# Patient Record
Sex: FEMALE | Race: WHITE | Employment: OTHER | URBAN - METROPOLITAN AREA
[De-identification: names, ages, dates, MRNs, and addresses within clinical notes are randomized per-mention and may not be internally consistent; named-entity substitution may affect disease eponyms.]

---

## 2019-07-01 ENCOUNTER — MEDICAL CORRESPONDENCE (OUTPATIENT)
Dept: HEALTH INFORMATION MANAGEMENT | Facility: CLINIC | Age: 84
End: 2019-07-01

## 2019-07-18 ENCOUNTER — APPOINTMENT (OUTPATIENT)
Dept: GENERAL RADIOLOGY | Facility: HOSPITAL | Age: 84
DRG: 689 | End: 2019-07-18
Attending: EMERGENCY MEDICINE
Payer: MEDICARE

## 2019-07-18 ENCOUNTER — HOSPITAL ENCOUNTER (INPATIENT)
Facility: HOSPITAL | Age: 84
LOS: 5 days | Discharge: NURSING, REHAB OR LONG-TERM CARE | DRG: 689 | End: 2019-07-24
Attending: EMERGENCY MEDICINE | Admitting: INTERNAL MEDICINE
Payer: MEDICARE

## 2019-07-18 ENCOUNTER — APPOINTMENT (OUTPATIENT)
Dept: CT IMAGING | Facility: HOSPITAL | Age: 84
DRG: 689 | End: 2019-07-18
Attending: EMERGENCY MEDICINE
Payer: MEDICARE

## 2019-07-18 DIAGNOSIS — R55 SYNCOPE: ICD-10-CM

## 2019-07-18 DIAGNOSIS — I95.1 ORTHOSTATIC HYPOTENSION: ICD-10-CM

## 2019-07-18 DIAGNOSIS — S01.01XA SCALP LACERATION, INITIAL ENCOUNTER: ICD-10-CM

## 2019-07-18 DIAGNOSIS — E11.9 TYPE 2 DIABETES MELLITUS WITHOUT COMPLICATION, WITHOUT LONG-TERM CURRENT USE OF INSULIN (H): ICD-10-CM

## 2019-07-18 DIAGNOSIS — I10 ESSENTIAL HYPERTENSION: ICD-10-CM

## 2019-07-18 DIAGNOSIS — G89.29 CHRONIC LEFT HIP PAIN: ICD-10-CM

## 2019-07-18 DIAGNOSIS — R55 SYNCOPE, UNSPECIFIED SYNCOPE TYPE: ICD-10-CM

## 2019-07-18 DIAGNOSIS — R21 RASH: ICD-10-CM

## 2019-07-18 DIAGNOSIS — M25.552 CHRONIC LEFT HIP PAIN: ICD-10-CM
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
## 2019-07-18 DIAGNOSIS — K21.9 GASTROESOPHAGEAL REFLUX DISEASE WITHOUT ESOPHAGITIS: ICD-10-CM

## 2019-07-18 DIAGNOSIS — N30.00 ACUTE CYSTITIS WITHOUT HEMATURIA: ICD-10-CM

## 2019-07-18 DIAGNOSIS — S31.000A WOUND OF SACRAL REGION, INITIAL ENCOUNTER: ICD-10-CM

## 2019-07-18 DIAGNOSIS — S72.102A CLOSED FRACTURE OF TROCHANTER OF LEFT FEMUR, INITIAL ENCOUNTER (H): ICD-10-CM

## 2019-07-18 DIAGNOSIS — R11.0 NAUSEA: ICD-10-CM

## 2019-07-18 DIAGNOSIS — K59.00 CONSTIPATION, UNSPECIFIED CONSTIPATION TYPE: ICD-10-CM

## 2019-07-18 LAB
ALBUMIN SERPL-MCNC: 3.4 G/DL (ref 3.4–5)
ALBUMIN UR-MCNC: 10 MG/DL
ALP SERPL-CCNC: 144 U/L (ref 40–150)
ALT SERPL W P-5'-P-CCNC: 20 U/L (ref 0–50)
ANION GAP SERPL CALCULATED.3IONS-SCNC: 8 MMOL/L (ref 3–14)
APPEARANCE UR: ABNORMAL
AST SERPL W P-5'-P-CCNC: 16 U/L (ref 0–45)
BACTERIA #/AREA URNS HPF: ABNORMAL /HPF
BASOPHILS # BLD AUTO: 0.1 10E9/L (ref 0–0.2)
BASOPHILS NFR BLD AUTO: 0.7 %
BILIRUB SERPL-MCNC: 0.1 MG/DL (ref 0.2–1.3)
BILIRUB UR QL STRIP: NEGATIVE
BUN SERPL-MCNC: 20 MG/DL (ref 7–30)
CALCIUM SERPL-MCNC: 9.4 MG/DL (ref 8.5–10.1)
CHLORIDE SERPL-SCNC: 113 MMOL/L (ref 94–109)
CO2 SERPL-SCNC: 19 MMOL/L (ref 20–32)
COLOR UR AUTO: YELLOW
CREAT SERPL-MCNC: 0.71 MG/DL (ref 0.52–1.04)
DIFFERENTIAL METHOD BLD: ABNORMAL
EOSINOPHIL # BLD AUTO: 0.3 10E9/L (ref 0–0.7)
EOSINOPHIL NFR BLD AUTO: 3.8 %
ERYTHROCYTE [DISTWIDTH] IN BLOOD BY AUTOMATED COUNT: 13.9 % (ref 10–15)
GFR SERPL CREATININE-BSD FRML MDRD: 75 ML/MIN/{1.73_M2}
GLUCOSE SERPL-MCNC: 169 MG/DL (ref 70–99)
GLUCOSE UR STRIP-MCNC: NEGATIVE MG/DL
HCT VFR BLD AUTO: 35.8 % (ref 35–47)
HGB BLD-MCNC: 11.6 G/DL (ref 11.7–15.7)
HGB UR QL STRIP: NEGATIVE
IMM GRANULOCYTES # BLD: 0.1 10E9/L (ref 0–0.4)
IMM GRANULOCYTES NFR BLD: 1 %
INR PPP: 1.02 (ref 0.8–1.2)
KETONES UR STRIP-MCNC: NEGATIVE MG/DL
LEUKOCYTE ESTERASE UR QL STRIP: ABNORMAL
LYMPHOCYTES # BLD AUTO: 2.5 10E9/L (ref 0.8–5.3)
LYMPHOCYTES NFR BLD AUTO: 29.1 %
MCH RBC QN AUTO: 30.1 PG (ref 26.5–33)
MCHC RBC AUTO-ENTMCNC: 32.4 G/DL (ref 31.5–36.5)
MCV RBC AUTO: 93 FL (ref 78–100)
MONOCYTES # BLD AUTO: 0.6 10E9/L (ref 0–1.3)
MONOCYTES NFR BLD AUTO: 6.5 %
MUCOUS THREADS #/AREA URNS LPF: PRESENT /LPF
NEUTROPHILS # BLD AUTO: 5.1 10E9/L (ref 1.6–8.3)
NEUTROPHILS NFR BLD AUTO: 58.9 %
NITRATE UR QL: NEGATIVE
NRBC # BLD AUTO: 0 10*3/UL
NRBC BLD AUTO-RTO: 0 /100
PH UR STRIP: 6 PH (ref 4.7–8)
PLATELET # BLD AUTO: 398 10E9/L (ref 150–450)
POTASSIUM SERPL-SCNC: 4.8 MMOL/L (ref 3.4–5.3)
PROT SERPL-MCNC: 7.1 G/DL (ref 6.8–8.8)
RBC # BLD AUTO: 3.85 10E12/L (ref 3.8–5.2)
RBC #/AREA URNS AUTO: <1 /HPF (ref 0–2)
SODIUM SERPL-SCNC: 140 MMOL/L (ref 133–144)
SOURCE: ABNORMAL
SP GR UR STRIP: 1.02 (ref 1–1.03)
TROPONIN I SERPL-MCNC: <0.015 UG/L (ref 0–0.04)
UROBILINOGEN UR STRIP-MCNC: NORMAL MG/DL (ref 0–2)
WBC # BLD AUTO: 8.6 10E9/L (ref 4–11)
WBC #/AREA URNS AUTO: 7 /HPF (ref 0–5)

## 2019-07-18 PROCEDURE — 96360 HYDRATION IV INFUSION INIT: CPT

## 2019-07-18 PROCEDURE — 85025 COMPLETE CBC W/AUTO DIFF WBC: CPT | Performed by: EMERGENCY MEDICINE

## 2019-07-18 PROCEDURE — 80053 COMPREHEN METABOLIC PANEL: CPT | Performed by: EMERGENCY MEDICINE

## 2019-07-18 PROCEDURE — 93010 ELECTROCARDIOGRAM REPORT: CPT | Performed by: INTERNAL MEDICINE

## 2019-07-18 PROCEDURE — 70450 CT HEAD/BRAIN W/O DYE: CPT | Mod: TC

## 2019-07-18 PROCEDURE — 93005 ELECTROCARDIOGRAM TRACING: CPT

## 2019-07-18 PROCEDURE — 40000788 ZZHCL STATISTIC ESTIMATED AVERAGE GLUCOSE: Performed by: HOSPITALIST

## 2019-07-18 PROCEDURE — 84484 ASSAY OF TROPONIN QUANT: CPT | Performed by: EMERGENCY MEDICINE

## 2019-07-18 PROCEDURE — 25800030 ZZH RX IP 258 OP 636: Performed by: INTERNAL MEDICINE

## 2019-07-18 PROCEDURE — 83036 HEMOGLOBIN GLYCOSYLATED A1C: CPT | Performed by: HOSPITALIST

## 2019-07-18 PROCEDURE — 99283 EMERGENCY DEPT VISIT LOW MDM: CPT | Mod: Z6 | Performed by: EMERGENCY MEDICINE

## 2019-07-18 PROCEDURE — 72125 CT NECK SPINE W/O DYE: CPT | Mod: TC

## 2019-07-18 PROCEDURE — 87086 URINE CULTURE/COLONY COUNT: CPT | Performed by: NURSE PRACTITIONER

## 2019-07-18 PROCEDURE — 85610 PROTHROMBIN TIME: CPT | Performed by: EMERGENCY MEDICINE

## 2019-07-18 PROCEDURE — 81001 URINALYSIS AUTO W/SCOPE: CPT | Performed by: EMERGENCY MEDICINE

## 2019-07-18 PROCEDURE — 99222 1ST HOSP IP/OBS MODERATE 55: CPT | Mod: AI | Performed by: HOSPITALIST

## 2019-07-18 PROCEDURE — 71046 X-RAY EXAM CHEST 2 VIEWS: CPT | Mod: TC

## 2019-07-18 PROCEDURE — 36415 COLL VENOUS BLD VENIPUNCTURE: CPT | Performed by: EMERGENCY MEDICINE

## 2019-07-18 PROCEDURE — 25000128 H RX IP 250 OP 636: Performed by: EMERGENCY MEDICINE

## 2019-07-18 PROCEDURE — 99285 EMERGENCY DEPT VISIT HI MDM: CPT | Mod: 25

## 2019-07-18 PROCEDURE — 25000132 ZZH RX MED GY IP 250 OP 250 PS 637: Mod: GY

## 2019-07-18 RX ORDER — INSULIN GLARGINE 100 [IU]/ML
10 INJECTION, SOLUTION SUBCUTANEOUS AT BEDTIME
Status: ON HOLD | COMMUNITY
End: 2019-07-23

## 2019-07-18 RX ORDER — SODIUM CHLORIDE 9 MG/ML
INJECTION, SOLUTION INTRAVENOUS CONTINUOUS
Status: DISCONTINUED | OUTPATIENT
Start: 2019-07-18 | End: 2019-07-19

## 2019-07-18 RX ORDER — ASPIRIN 81 MG
100 TABLET, DELAYED RELEASE (ENTERIC COATED) ORAL DAILY
Status: ON HOLD | COMMUNITY
End: 2019-07-19

## 2019-07-18 RX ORDER — ACETAMINOPHEN 325 MG/1
650 TABLET ORAL ONCE
Status: COMPLETED | OUTPATIENT
Start: 2019-07-18 | End: 2019-07-18

## 2019-07-18 RX ORDER — ACETAMINOPHEN 325 MG/1
TABLET ORAL
Status: COMPLETED
Start: 2019-07-18 | End: 2019-07-18

## 2019-07-18 RX ORDER — GABAPENTIN 100 MG/1
200 CAPSULE ORAL 2 TIMES DAILY
COMMUNITY

## 2019-07-18 RX ORDER — L. ACIDOPHILUS/PECTIN, CITRUS 25MM-100MG
1 TABLET ORAL DAILY
COMMUNITY
End: 2019-11-05

## 2019-07-18 RX ORDER — ASPIRIN 81 MG/1
81 TABLET ORAL DAILY
COMMUNITY

## 2019-07-18 RX ADMIN — SODIUM CHLORIDE: 9 INJECTION, SOLUTION INTRAVENOUS at 23:32

## 2019-07-18 RX ADMIN — ACETAMINOPHEN 650 MG: 325 TABLET ORAL at 23:33

## 2019-07-18 RX ADMIN — SODIUM CHLORIDE 1000 ML: 9 INJECTION, SOLUTION INTRAVENOUS at 20:08

## 2019-07-18 RX ADMIN — ACETAMINOPHEN 650 MG: 325 TABLET, FILM COATED ORAL at 23:33

## 2019-07-18 ASSESSMENT — ENCOUNTER SYMPTOMS
SHORTNESS OF BREATH: 0
FEVER: 0
ABDOMINAL PAIN: 0

## 2019-07-18 NOTE — ED PROVIDER NOTES
History     Chief Complaint   Patient presents with     Fall     in bathroom. hit head. positive loss of consciousness. no blood thinners      HPI  Nicole Johnson is a 89 year old female who presents to the emergency department via EMS.  Patient apparently fell in the bathroom and hit the back of the head with bleeding.  There was loss of consciousness.  Patient does not know whether she slipped and fell or she passed out and fell.  All the history was obtained from the nursing staff.  On asking patient questions, she states that she does not remember and she does not know.  Denies any headache or neck pains.  No nausea, vomiting, back pain, weakness on the legs or one side of the body.    Allergies:  Allergies   Allergen Reactions     Carvedilol      Fentanyl      Gabapentin      Glucophage [Metformin]      Januvia [Sitagliptin]      Latex      Plavix [Clopidogrel]        Problem List:    Patient Active Problem List    Diagnosis Date Noted     Syncope 07/19/2019     Priority: Medium     Falls frequently 07/19/2019     Priority: Medium     Urinary retention 07/19/2019     Priority: Medium     Chronic left hip pain 07/04/2019     Priority: Medium     Anxiety with depression 07/30/2018     Priority: Medium     Bilateral sciatica 07/30/2018     Priority: Medium     Has tried multiple meds did not tolerate.  Has had injections from pain management for this.       Essential hypertension 07/30/2018     Priority: Medium     Gastroesophageal reflux disease without esophagitis 07/30/2018     Priority: Medium     Seasonal allergic rhinitis 07/30/2018     Priority: Medium     Vitamin D deficiency 07/30/2018     Priority: Medium     Type 2 diabetes mellitus without complication, without long-term current use of insulin (H) 07/30/1996     Priority: Medium        Past Medical History:    Past Medical History:   Diagnosis Date     Anxiety with depression 7/30/2018     Bilateral artificial lens implant 10/18/2018     Bilateral  pubic rami fractures (H) 2019     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     Chronic left hip pain 7/4/2019     CVA (cerebral vascular accident) (H) 2011     Essential hypertension 7/30/2018     Gastroesophageal reflux disease without esophagitis 7/30/2018     Macular retinal edema 10/18/2018     Multiple falls      Other constipation 7/30/2018     Seasonal allergic rhinitis 7/30/2018     Syncope 7/19/2019     Type 2 diabetes mellitus without complication, without long-term current use of insulin (H) 7/30/1996     Vitamin D deficiency 7/30/2018       Past Surgical History:    Past Surgical History:   Procedure Laterality Date     ARTHROPLASTY ELBOW  2011     CHOLECYSTECTOMY  1984       Family History:    No family history on file.    Social History:  Marital Status:    Social History     Tobacco Use     Smoking status: None   Substance Use Topics     Alcohol use: None     Drug use: None        Medications:      No current outpatient medications on file.      Review of Systems   Constitutional: Negative for fever.   Respiratory: Negative for shortness of breath.    Cardiovascular: Negative for chest pain.   Gastrointestinal: Negative for abdominal pain.   All other systems reviewed and are negative.      Physical Exam   BP: 150/76  Heart Rate: 62  Temp: 97.7  F (36.5  C)  Resp: 16  Weight: 61 kg (134 lb 7.7 oz)  SpO2: 94 %  Lying Orthostatic BP: 196/66  Lying Orthostatic Pulse: 71 bpm  Sitting Orthostatic BP: 150/61  Sitting Orthostatic Pulse: 70 bpm  Standing Orthostatic BP: 157/103  Standing Orthostatic Pulse: 74 bpm      Physical Exam   Constitutional: She appears well-developed and well-nourished. No distress.   HENT:   Head: Normocephalic.       Mouth/Throat: Oropharynx is clear and moist. No oropharyngeal exudate.   Eyes: Pupils are equal, round, and reactive to light. No scleral icterus.   Neck:   C-collar in place   Cardiovascular: Normal rate, regular rhythm, normal heart sounds and intact distal pulses.    Pulmonary/Chest: Effort normal and breath sounds normal. No stridor. No respiratory distress. She has no wheezes.   Abdominal: Soft. Bowel sounds are normal. She exhibits no distension. There is no tenderness.   Musculoskeletal: She exhibits no edema or tenderness.   Skin: Skin is warm. No rash noted. She is not diaphoretic.   Nursing note and vitals reviewed.      ED Course        Procedures       EKG Interpretation:      Interpreted by Margarito Mae  Time reviewed: 6:05 PM  Symptoms at time of EKG: Heartburn  Rhythm: normal sinus   Rate: normal  Axis: normal  Ectopy: none  Conduction: normal  ST Segments/ T Waves: No ST-T wave changes  Q Waves: none  Comparison to prior: No old EKG available    Clinical Impression: normal EKG      Results for orders placed or performed during the hospital encounter of 07/18/19 (from the past 24 hour(s))   Glucose by meter   Result Value Ref Range    Glucose 120 (H) 70 - 99 mg/dL   MR Brain w/o & w Contrast    Narrative    PROCEDURE: MR BRAIN W/O & W CONTRAST 7/19/2019 9:51 AM    HISTORY: Syncope, simple, abn neuro exam    COMPARISONS: None.    Meds/Dose Given:    TECHNIQUE: Images were obtained sagittally T1 weighted images were  obtained axially diffusion FLAIR gradient echo T1 and T2-weighted.  Images were obtained axially and coronally T1 weighted following  gadolinium administration    FINDINGS: There is extensive white matter bright signal in both  hemispheres consistent with small vessel changes. There are no  enhancing masses ventricular shifts or extracerebral collection. No  acute infarcts are noted. On the gradient echo images there is  abnormal low signal seen in the basal ganglia. This is most likely on  the basis of old age and degeneration. The signal changes are  sometimes associated with Parkinson's disease. The pituitary optic  chiasm and basal cisterns appear normal. The internal auditory canals  appear normal.         Impression    IMPRESSION: Small vessel  changes in both hemispheres. No acute  infarcts. Low signal on the gradient echo images in the basal ganglion  sometimes associated with Parkinson's disease. No enhancing masses or  ventricular shifts.    JOSÉ MIGUEL OCONNELL MD   Glucose by meter   Result Value Ref Range    Glucose 135 (H) 70 - 99 mg/dL   CT Lumbar Spine w/o Contrast    Narrative    PROCEDURE: CT LUMBAR SPINE W/O CONTRAST 7/19/2019 4:35 PM    HISTORY: Radiculopathy, > 6wks conservative tx, persistent sx    COMPARISONS: None.    Meds/Dose Given:    TECHNIQUE: CT scan of the lumbar spine with sagittal coronal  reconstructions    FINDINGS: The T11 T12 T12 L1 L1 L2 discs appear normal. There is  gaseous degeneration and loss of vertical disc space height noted at  L2-L3 facet joint degenerative changes are noted. There is decrease in  height in the L3-L4 disc broad-based annular bulging degenerative  osteophyte formation and facet joint degenerative change. This results  in moderate central spinal stenosis at L3-L4. At L4-L5 there is  gaseous degeneration of the disc and facet joint degenerative change  and broad-based annular bulging. At L5-S1 there is loss of vertical  disc space height noted facet joint degenerative changes. There are no  fractures of the vertebral bodies or arches are noted.         Impression    IMPRESSION: Moderate degenerative changes of the lumbar spine no acute  fracture.    JOSÉ MIGUEL OCONNELL MD   CT Hip Left w/o Contrast    Narrative    PROCEDURE: CT HIP LEFT W/O CONTRAST 7/19/2019 4:36 PM    HISTORY: Hip trauma, fx known or suspected, xray insufficient; Acute  on chronic hip pain after fall 7/2, frequent falls afterwards, urinary  retention    COMPARISONS: None.    Meds/Dose Given:    TECHNIQUE: CT scan of the left hip with sagittal coronal  reconstructions    FINDINGS: There is a fracture of the greater trochanter. The femoral  head and neck are intact. The acetabulum appears intact. There is an  old ischial fracture with  residual deformity.         Impression    IMPRESSION: Fracture of the greater trochanter.    JOSÉ MIGUEL OCONNELL MD   Glucose by meter   Result Value Ref Range    Glucose 164 (H) 70 - 99 mg/dL   Glucose by meter   Result Value Ref Range    Glucose 190 (H) 70 - 99 mg/dL   CBC with platelets   Result Value Ref Range    WBC 7.9 4.0 - 11.0 10e9/L    RBC Count 3.58 (L) 3.8 - 5.2 10e12/L    Hemoglobin 10.8 (L) 11.7 - 15.7 g/dL    Hematocrit 34.0 (L) 35.0 - 47.0 %    MCV 95 78 - 100 fl    MCH 30.2 26.5 - 33.0 pg    MCHC 31.8 31.5 - 36.5 g/dL    RDW 13.9 10.0 - 15.0 %    Platelet Count 329 150 - 450 10e9/L   Basic metabolic panel   Result Value Ref Range    Sodium 140 133 - 144 mmol/L    Potassium 4.4 3.4 - 5.3 mmol/L    Chloride 113 (H) 94 - 109 mmol/L    Carbon Dioxide 21 20 - 32 mmol/L    Anion Gap 6 3 - 14 mmol/L    Glucose 135 (H) 70 - 99 mg/dL    Urea Nitrogen 15 7 - 30 mg/dL    Creatinine 0.63 0.52 - 1.04 mg/dL    GFR Estimate 79 >60 mL/min/[1.73_m2]    GFR Estimate If Black >90 >60 mL/min/[1.73_m2]    Calcium 8.8 8.5 - 10.1 mg/dL       Medications   lidocaine 1 % 0.1-1 mL (has no administration in time range)   lidocaine (LMX4) kit (has no administration in time range)   sodium chloride (PF) 0.9% PF flush 3 mL (has no administration in time range)   sodium chloride (PF) 0.9% PF flush 3 mL (3 mLs Intracatheter Given 7/20/19 0036)   naloxone (NARCAN) injection 0.1-0.4 mg (has no administration in time range)   melatonin tablet 1 mg (has no administration in time range)   acetaminophen (TYLENOL) tablet 650 mg (650 mg Oral Given 7/20/19 7754)   ondansetron (ZOFRAN-ODT) ODT tab 4 mg (has no administration in time range)     Or   ondansetron (ZOFRAN) injection 4 mg (has no administration in time range)   polyethylene glycol (MIRALAX/GLYCOLAX) Packet 17 g (has no administration in time range)   hydrALAZINE (APRESOLINE) injection 10 mg (has no administration in time range)   glucose gel 15-30 g (has no administration in time  range)     Or   dextrose 50 % injection 25-50 mL (has no administration in time range)     Or   glucagon injection 1 mg (has no administration in time range)   insulin aspart (NovoLOG) inj (RAPID ACTING) (1 Units Subcutaneous Given 7/19/19 1647)   insulin aspart (NovoLOG) inj (RAPID ACTING) (0 Units Subcutaneous Not Given 7/19/19 2159)   oxyCODONE IR (ROXICODONE) half-tab 2.5 mg (2.5 mg Oral Given 7/20/19 0602)   diphenhydrAMINE (BENADRYL) injection 25 mg (25 mg Intravenous Given 7/19/19 1843)   calcium carbonate (TUMS) chewable tablet 500 mg (500 mg Oral Given 7/19/19 1842)   aspirin EC tablet 81 mg (81 mg Oral Given 7/19/19 1216)   glimepiride (AMARYL) tablet 6 mg (6 mg Oral Given 7/19/19 1216)   insulin glargine (BASAGLAR KWIKPEN) injection 10 Units (10 Units Subcutaneous Given 7/19/19 2200)   lactobacillus rhamnosus (GG) (CULTURELL) capsule 1 capsule (1 capsule Oral Given 7/19/19 1216)   magnesium hydroxide (MILK OF MAGNESIA) suspension 30 mL (30 mLs Oral Given 7/19/19 1332)   hydrocortisone 2.5 % cream ( Topical Given 7/19/19 1841)   gabapentin (NEURONTIN) capsule 200 mg (200 mg Oral Given 7/19/19 2155)   dextrose 5% in lactated ringers infusion ( Intravenous New Bag 7/20/19 0036)   lidocaine HCl (XYLOCAINE) 2 % 15 mL, alum & mag hydroxide-simethicone (MYLANTA ES/MAALOX  ES) 15 mL GI Cocktail (30 mLs Oral Given 7/19/19 2202)   amLODIPine (NORVASC) tablet 5 mg (5 mg Oral Given 7/19/19 2155)   heparin sodium injection 5,000 Units (5,000 Units Subcutaneous Given 7/20/19 0037)   0.9% sodium chloride BOLUS (0 mLs Intravenous Stopped 7/18/19 2110)   acetaminophen (TYLENOL) tablet 650 mg (650 mg Oral Given 7/18/19 2333)   sodium chloride (PF) 0.9% PF flush 5 mL (3 mLs Intravenous Given 7/19/19 1042)   gadobutrol (GADAVIST) injection 2 mL (2 mLs Intravenous Given 7/19/19 0939)   gadobutrol (GADAVIST) injection 2 mL (2 mLs Intravenous Given 7/19/19 0939)   gadobutrol (GADAVIST) injection 2 mL (2 mLs Intravenous Given  7/19/19 0938)       Assessments & Plan (with Medical Decision Making)   Patient care transferred to Dr. Naranjo at shift change at 8 PM.    I have reviewed the nursing notes.    I have reviewed the findings, diagnosis, plan and need for follow up with the patient.       Medication List      There are no discharge medications for this visit.         Final diagnoses:   Fall, initial encounter   Scalp laceration, initial encounter   Syncope, unspecified syncope type   Orthostatic hypotension       7/18/2019   [facility]     Margarito Mae MD  07/20/19 0848

## 2019-07-18 NOTE — ED NOTES
Patient arrives via [facility] for evaluation of a fall. Patient fell in the bathroom. Reports hitting her head and losing consciousness. Denies any anticoagulation. Laceration noted to posterior head, bleeding controlled. Denies headache or vomiting. Reporting neck pain, C-collar placed on arrival. Denies any other pain at this time. IV placed and call light within reach.

## 2019-07-18 NOTE — ED NOTES
Discharge instructions completed with patient and family members with no questions or concerns. Vital signs stable. Written Rx given. Will return with any worsening.

## 2019-07-19 ENCOUNTER — APPOINTMENT (OUTPATIENT)
Dept: CT IMAGING | Facility: HOSPITAL | Age: 84
DRG: 689 | End: 2019-07-19
Attending: NURSE PRACTITIONER
Payer: MEDICARE

## 2019-07-19 ENCOUNTER — APPOINTMENT (OUTPATIENT)
Dept: MRI IMAGING | Facility: HOSPITAL | Age: 84
DRG: 689 | End: 2019-07-19
Attending: HOSPITALIST
Payer: MEDICARE

## 2019-07-19 ENCOUNTER — APPOINTMENT (OUTPATIENT)
Dept: OCCUPATIONAL THERAPY | Facility: HOSPITAL | Age: 84
DRG: 689 | End: 2019-07-19
Attending: HOSPITALIST
Payer: MEDICARE

## 2019-07-19 ENCOUNTER — APPOINTMENT (OUTPATIENT)
Dept: PHYSICAL THERAPY | Facility: HOSPITAL | Age: 84
DRG: 689 | End: 2019-07-19
Payer: MEDICARE

## 2019-07-19 PROBLEM — K59.09 OTHER CONSTIPATION: Status: RESOLVED | Noted: 2018-07-30 | Resolved: 2019-07-19

## 2019-07-19 PROBLEM — J30.2 SEASONAL ALLERGIC RHINITIS: Status: ACTIVE | Noted: 2018-07-30

## 2019-07-19 PROBLEM — Z96.1 BILATERAL ARTIFICIAL LENS IMPLANT: Status: ACTIVE | Noted: 2018-10-18

## 2019-07-19 PROBLEM — F41.8 ANXIETY WITH DEPRESSION: Status: ACTIVE | Noted: 2018-07-30

## 2019-07-19 PROBLEM — K21.9 GASTROESOPHAGEAL REFLUX DISEASE WITHOUT ESOPHAGITIS: Status: ACTIVE | Noted: 2018-07-30

## 2019-07-19 PROBLEM — H35.81 MACULAR RETINAL EDEMA: Status: ACTIVE | Noted: 2018-10-18

## 2019-07-19 PROBLEM — E55.9 VITAMIN D DEFICIENCY: Status: ACTIVE | Noted: 2018-07-30

## 2019-07-19 PROBLEM — M25.552 CHRONIC LEFT HIP PAIN: Status: ACTIVE | Noted: 2019-07-04

## 2019-07-19 PROBLEM — M54.31 BILATERAL SCIATICA: Status: ACTIVE | Noted: 2018-07-30

## 2019-07-19 PROBLEM — Z96.1 BILATERAL ARTIFICIAL LENS IMPLANT: Status: RESOLVED | Noted: 2018-10-18 | Resolved: 2019-07-19

## 2019-07-19 PROBLEM — D31.31 CHOROIDAL NEVUS OF RIGHT EYE: Status: ACTIVE | Noted: 2018-10-18

## 2019-07-19 PROBLEM — G89.29 CHRONIC LEFT HIP PAIN: Status: ACTIVE | Noted: 2019-07-04

## 2019-07-19 PROBLEM — I10 ESSENTIAL HYPERTENSION: Status: ACTIVE | Noted: 2018-07-30

## 2019-07-19 PROBLEM — D31.31 CHOROIDAL NEVUS OF RIGHT EYE: Status: RESOLVED | Noted: 2018-10-18 | Resolved: 2019-07-19

## 2019-07-19 PROBLEM — R55 SYNCOPE: Status: ACTIVE | Noted: 2019-07-19

## 2019-07-19 PROBLEM — H35.81 MACULAR RETINAL EDEMA: Status: RESOLVED | Noted: 2018-10-18 | Resolved: 2019-07-19

## 2019-07-19 PROBLEM — K59.09 OTHER CONSTIPATION: Status: ACTIVE | Noted: 2018-07-30

## 2019-07-19 PROBLEM — R29.6 FALLS FREQUENTLY: Status: ACTIVE | Noted: 2019-07-19

## 2019-07-19 PROBLEM — M54.32 BILATERAL SCIATICA: Status: ACTIVE | Noted: 2018-07-30

## 2019-07-19 PROBLEM — R33.9 URINARY RETENTION: Status: ACTIVE | Noted: 2019-07-19

## 2019-07-19 LAB
EST. AVERAGE GLUCOSE BLD GHB EST-MCNC: 151 MG/DL
GLUCOSE BLDC GLUCOMTR-MCNC: 120 MG/DL (ref 70–99)
GLUCOSE BLDC GLUCOMTR-MCNC: 135 MG/DL (ref 70–99)
GLUCOSE BLDC GLUCOMTR-MCNC: 152 MG/DL (ref 70–99)
GLUCOSE BLDC GLUCOMTR-MCNC: 164 MG/DL (ref 70–99)
GLUCOSE BLDC GLUCOMTR-MCNC: 190 MG/DL (ref 70–99)
HBA1C MFR BLD: 6.9 % (ref 0–5.6)

## 2019-07-19 PROCEDURE — 25000128 H RX IP 250 OP 636: Performed by: HOSPITALIST

## 2019-07-19 PROCEDURE — 97530 THERAPEUTIC ACTIVITIES: CPT | Mod: GO

## 2019-07-19 PROCEDURE — A9585 GADOBUTROL INJECTION: HCPCS | Performed by: RADIOLOGY

## 2019-07-19 PROCEDURE — 25000125 ZZHC RX 250: Performed by: HOSPITALIST

## 2019-07-19 PROCEDURE — 97161 PT EVAL LOW COMPLEX 20 MIN: CPT | Mod: GP

## 2019-07-19 PROCEDURE — 25000132 ZZH RX MED GY IP 250 OP 250 PS 637: Mod: GY | Performed by: HOSPITALIST

## 2019-07-19 PROCEDURE — 25000132 ZZH RX MED GY IP 250 OP 250 PS 637: Mod: GY | Performed by: NURSE PRACTITIONER

## 2019-07-19 PROCEDURE — 40000786 ZZHCL STATISTIC ACTIVE MRSA SURVEILLANCE CULTURE: Performed by: HOSPITALIST

## 2019-07-19 PROCEDURE — 72131 CT LUMBAR SPINE W/O DYE: CPT | Mod: TC

## 2019-07-19 PROCEDURE — 73700 CT LOWER EXTREMITY W/O DYE: CPT | Mod: TC,LT

## 2019-07-19 PROCEDURE — 00000146 ZZHCL STATISTIC GLUCOSE BY METER IP

## 2019-07-19 PROCEDURE — 99232 SBSQ HOSP IP/OBS MODERATE 35: CPT | Performed by: NURSE PRACTITIONER

## 2019-07-19 PROCEDURE — G0378 HOSPITAL OBSERVATION PER HR: HCPCS

## 2019-07-19 PROCEDURE — 70553 MRI BRAIN STEM W/O & W/DYE: CPT | Mod: TC

## 2019-07-19 PROCEDURE — 25500064 ZZH RX 255 OP 636: Performed by: RADIOLOGY

## 2019-07-19 PROCEDURE — 25000131 ZZH RX MED GY IP 250 OP 636 PS 637: Mod: GY | Performed by: HOSPITALIST

## 2019-07-19 PROCEDURE — 25000125 ZZHC RX 250: Performed by: NURSE PRACTITIONER

## 2019-07-19 PROCEDURE — 12000000 ZZH R&B MED SURG/OB

## 2019-07-19 PROCEDURE — 97165 OT EVAL LOW COMPLEX 30 MIN: CPT | Mod: GO

## 2019-07-19 RX ORDER — HEPARIN SODIUM 1000 [USP'U]/ML
5000 INJECTION, SOLUTION INTRAVENOUS; SUBCUTANEOUS EVERY 8 HOURS
Status: DISCONTINUED | OUTPATIENT
Start: 2019-07-19 | End: 2019-07-19

## 2019-07-19 RX ORDER — HYDRALAZINE HYDROCHLORIDE 20 MG/ML
10 INJECTION INTRAMUSCULAR; INTRAVENOUS EVERY 6 HOURS PRN
Status: DISCONTINUED | OUTPATIENT
Start: 2019-07-19 | End: 2019-07-24 | Stop reason: HOSPADM

## 2019-07-19 RX ORDER — AMLODIPINE BESYLATE 5 MG/1
5 TABLET ORAL DAILY
Status: DISCONTINUED | OUTPATIENT
Start: 2019-07-19 | End: 2019-07-24 | Stop reason: HOSPADM

## 2019-07-19 RX ORDER — ASPIRIN 81 MG/1
81 TABLET ORAL DAILY
Status: DISCONTINUED | OUTPATIENT
Start: 2019-07-19 | End: 2019-07-24 | Stop reason: HOSPADM

## 2019-07-19 RX ORDER — LIDOCAINE 40 MG/G
CREAM TOPICAL
Status: DISCONTINUED | OUTPATIENT
Start: 2019-07-19 | End: 2019-07-24 | Stop reason: HOSPADM

## 2019-07-19 RX ORDER — ACETAMINOPHEN 325 MG/1
650 TABLET ORAL EVERY 4 HOURS PRN
Status: DISCONTINUED | OUTPATIENT
Start: 2019-07-19 | End: 2019-07-24 | Stop reason: HOSPADM

## 2019-07-19 RX ORDER — BENZOCAINE/MENTHOL 6 MG-10 MG
LOZENGE MUCOUS MEMBRANE 2 TIMES DAILY PRN
Status: DISCONTINUED | OUTPATIENT
Start: 2019-07-19 | End: 2019-07-19

## 2019-07-19 RX ORDER — POLYETHYLENE GLYCOL 3350 17 G/17G
17 POWDER, FOR SOLUTION ORAL DAILY PRN
Status: DISCONTINUED | OUTPATIENT
Start: 2019-07-19 | End: 2019-07-24 | Stop reason: HOSPADM

## 2019-07-19 RX ORDER — GADOBUTROL 604.72 MG/ML
2 INJECTION INTRAVENOUS ONCE
Status: COMPLETED | OUTPATIENT
Start: 2019-07-19 | End: 2019-07-19

## 2019-07-19 RX ORDER — DIPHENHYDRAMINE HYDROCHLORIDE 50 MG/ML
25 INJECTION INTRAMUSCULAR; INTRAVENOUS EVERY 6 HOURS PRN
Status: DISCONTINUED | OUTPATIENT
Start: 2019-07-19 | End: 2019-07-24 | Stop reason: HOSPADM

## 2019-07-19 RX ORDER — OXYCODONE HYDROCHLORIDE 5 MG/1
5 TABLET ORAL EVERY 6 HOURS PRN
Status: DISCONTINUED | OUTPATIENT
Start: 2019-07-19 | End: 2019-07-19

## 2019-07-19 RX ORDER — HYDROCORTISONE 2.5 %
CREAM (GRAM) TOPICAL 3 TIMES DAILY
Status: DISCONTINUED | OUTPATIENT
Start: 2019-07-19 | End: 2019-07-24 | Stop reason: HOSPADM

## 2019-07-19 RX ORDER — ONDANSETRON 2 MG/ML
4 INJECTION INTRAMUSCULAR; INTRAVENOUS EVERY 6 HOURS PRN
Status: DISCONTINUED | OUTPATIENT
Start: 2019-07-19 | End: 2019-07-24 | Stop reason: HOSPADM

## 2019-07-19 RX ORDER — GABAPENTIN 100 MG/1
200 CAPSULE ORAL 2 TIMES DAILY
Status: DISCONTINUED | OUTPATIENT
Start: 2019-07-19 | End: 2019-07-24 | Stop reason: HOSPADM

## 2019-07-19 RX ORDER — NALOXONE HYDROCHLORIDE 0.4 MG/ML
.1-.4 INJECTION, SOLUTION INTRAMUSCULAR; INTRAVENOUS; SUBCUTANEOUS
Status: DISCONTINUED | OUTPATIENT
Start: 2019-07-19 | End: 2019-07-24 | Stop reason: HOSPADM

## 2019-07-19 RX ORDER — LACTOBACILLUS RHAMNOSUS GG 10B CELL
1 CAPSULE ORAL DAILY
Status: DISCONTINUED | OUTPATIENT
Start: 2019-07-19 | End: 2019-07-24 | Stop reason: HOSPADM

## 2019-07-19 RX ORDER — GLIMEPIRIDE 2 MG/1
6 TABLET ORAL DAILY
Status: DISCONTINUED | OUTPATIENT
Start: 2019-07-19 | End: 2019-07-21

## 2019-07-19 RX ORDER — INSULIN GLARGINE 100 [IU]/ML
10 INJECTION, SOLUTION SUBCUTANEOUS AT BEDTIME
Status: DISCONTINUED | OUTPATIENT
Start: 2019-07-19 | End: 2019-07-21

## 2019-07-19 RX ORDER — DEXTROSE MONOHYDRATE 25 G/50ML
25-50 INJECTION, SOLUTION INTRAVENOUS
Status: DISCONTINUED | OUTPATIENT
Start: 2019-07-19 | End: 2019-07-24 | Stop reason: HOSPADM

## 2019-07-19 RX ORDER — DOCUSATE SODIUM 100 MG/1
100 CAPSULE, LIQUID FILLED ORAL 2 TIMES DAILY PRN
Status: ON HOLD | COMMUNITY
End: 2019-07-23

## 2019-07-19 RX ORDER — ONDANSETRON 4 MG/1
4 TABLET, ORALLY DISINTEGRATING ORAL EVERY 6 HOURS PRN
Status: DISCONTINUED | OUTPATIENT
Start: 2019-07-19 | End: 2019-07-24 | Stop reason: HOSPADM

## 2019-07-19 RX ORDER — NICOTINE POLACRILEX 4 MG
15-30 LOZENGE BUCCAL
Status: DISCONTINUED | OUTPATIENT
Start: 2019-07-19 | End: 2019-07-24 | Stop reason: HOSPADM

## 2019-07-19 RX ORDER — DEXTROSE, SODIUM CHLORIDE, SODIUM LACTATE, POTASSIUM CHLORIDE, AND CALCIUM CHLORIDE 5; .6; .31; .03; .02 G/100ML; G/100ML; G/100ML; G/100ML; G/100ML
INJECTION, SOLUTION INTRAVENOUS CONTINUOUS
Status: DISCONTINUED | OUTPATIENT
Start: 2019-07-20 | End: 2019-07-20

## 2019-07-19 RX ORDER — HEPARIN SODIUM 1000 [USP'U]/ML
5000 INJECTION, SOLUTION INTRAVENOUS; SUBCUTANEOUS ONCE
Status: DISCONTINUED | OUTPATIENT
Start: 2019-07-19 | End: 2019-07-19

## 2019-07-19 RX ORDER — CALCIUM CARBONATE 500 MG/1
500 TABLET, CHEWABLE ORAL DAILY PRN
Status: DISCONTINUED | OUTPATIENT
Start: 2019-07-19 | End: 2019-07-21

## 2019-07-19 RX ORDER — GABAPENTIN 100 MG/1
100 CAPSULE ORAL 2 TIMES DAILY
Status: DISCONTINUED | OUTPATIENT
Start: 2019-07-19 | End: 2019-07-19

## 2019-07-19 RX ORDER — HEPARIN SODIUM 5000 [USP'U]/.5ML
5000 INJECTION, SOLUTION INTRAVENOUS; SUBCUTANEOUS EVERY 8 HOURS
Status: DISCONTINUED | OUTPATIENT
Start: 2019-07-20 | End: 2019-07-21

## 2019-07-19 RX ADMIN — INSULIN GLARGINE 10 UNITS: 100 INJECTION, SOLUTION SUBCUTANEOUS at 22:00

## 2019-07-19 RX ADMIN — OXYCODONE HYDROCHLORIDE 2.5 MG: 5 TABLET ORAL at 18:42

## 2019-07-19 RX ADMIN — LIDOCAINE HYDROCHLORIDE 30 ML: 20 SOLUTION ORAL; TOPICAL at 22:02

## 2019-07-19 RX ADMIN — AMLODIPINE BESYLATE 5 MG: 5 TABLET ORAL at 21:55

## 2019-07-19 RX ADMIN — HYDROCORTISONE: 25 CREAM TOPICAL at 13:32

## 2019-07-19 RX ADMIN — DIPHENHYDRAMINE HYDROCHLORIDE 25 MG: 50 INJECTION, SOLUTION INTRAMUSCULAR; INTRAVENOUS at 18:43

## 2019-07-19 RX ADMIN — OXYCODONE HYDROCHLORIDE 2.5 MG: 5 TABLET ORAL at 12:16

## 2019-07-19 RX ADMIN — GADOBUTROL 2 ML: 604.72 INJECTION INTRAVENOUS at 09:38

## 2019-07-19 RX ADMIN — ACETAMINOPHEN 650 MG: 325 TABLET, FILM COATED ORAL at 22:08

## 2019-07-19 RX ADMIN — GABAPENTIN 200 MG: 100 CAPSULE ORAL at 21:55

## 2019-07-19 RX ADMIN — ASPIRIN 81 MG: 81 TABLET, COATED ORAL at 12:16

## 2019-07-19 RX ADMIN — GABAPENTIN 100 MG: 100 CAPSULE ORAL at 12:27

## 2019-07-19 RX ADMIN — GADOBUTROL 2 ML: 604.72 INJECTION INTRAVENOUS at 09:39

## 2019-07-19 RX ADMIN — MAGNESIUM HYDROXIDE 30 ML: 400 SUSPENSION ORAL at 13:32

## 2019-07-19 RX ADMIN — HYDROCORTISONE: 25 CREAM TOPICAL at 18:41

## 2019-07-19 RX ADMIN — Medication 1 CAPSULE: at 12:16

## 2019-07-19 RX ADMIN — CALCIUM CARBONATE (ANTACID) CHEW TAB 500 MG 500 MG: 500 CHEW TAB at 18:42

## 2019-07-19 RX ADMIN — ACETAMINOPHEN 650 MG: 325 TABLET, FILM COATED ORAL at 10:37

## 2019-07-19 RX ADMIN — DIPHENHYDRAMINE HYDROCHLORIDE 25 MG: 50 INJECTION, SOLUTION INTRAMUSCULAR; INTRAVENOUS at 03:26

## 2019-07-19 RX ADMIN — GLIMEPIRIDE 6 MG: 2 TABLET ORAL at 12:16

## 2019-07-19 RX ADMIN — ACETAMINOPHEN 650 MG: 325 TABLET, FILM COATED ORAL at 16:48

## 2019-07-19 RX ADMIN — ACETAMINOPHEN 650 MG: 325 TABLET, FILM COATED ORAL at 03:26

## 2019-07-19 RX ADMIN — INSULIN ASPART 1 UNITS: 100 INJECTION, SOLUTION INTRAVENOUS; SUBCUTANEOUS at 16:47

## 2019-07-19 RX ADMIN — CALCIUM CARBONATE (ANTACID) CHEW TAB 500 MG 500 MG: 500 CHEW TAB at 10:37

## 2019-07-19 ASSESSMENT — ACTIVITIES OF DAILY LIVING (ADL)
ADLS_ACUITY_SCORE: 19
ADLS_ACUITY_SCORE: 23

## 2019-07-19 NOTE — PLAN OF CARE
VS as charted, afebrile. HRR. On telemetry, SR 60's with frequent PVC's with ST depression per ICU staff report. Lungs clear, bowels active. Pt does have some scattered bruises, laceration to back of the head that is slightly bleeding, just enough to tinge the pillow case, has a red itz rash across her hips on her lower back. Pt does complain of hip pain, right arm weakness (unfound upon nuero checks), has been having frequent bouts of small amounts of urine. Bladder scan @ 0400 revealed >544 mL, Dr. Anne ordered a straight cath, pt had 550mL odorous yellow urine out, it was discovered by the RN who cathed that pt has some lacerations in her vagina, that is painful with wiping, pt does not know where they came from, RN who straight cathed filed a vulnerable adult.  Pt is alert and oriented x 4, however slightly forgetful and repeats things often. Pt uses call light adequately, and often due to frequent urination.     Face to face report given with opportunity to observe patient.    Report given to JOE Lopes   7/19/2019  9:55 AM

## 2019-07-19 NOTE — PLAN OF CARE
Discharge Planner PT   Patient plan for discharge: TBD  Current status: Bed Mobility: Moderate assist and extra time. Needed lots of encouragement to get out of bed.   Transfers: Min A with FWW and cues for safety  Gait: Ambulated from bed to chair with  FWW and min to mod A to prevent loss of balance. Distance limited by c/o L hip pain and pt anxiety about falling.   Barriers to return to prior living situation: High fall risk, cognitive impairment, poor balance, weakness, decreased activity tolerance, pain   Recommendations for discharge: SNF  Rationale for recommendations: PT evaluation completed due to fall. Pt is currently not safe to return to [facility] due to weakness, poor balance and cognitive impairments. Recommend placement at short term rehab facility and pt may need more assistance in the long term. Pt is currently declining placement at a facility however discharge back to [facility] would not be safe. Plan to treat pt during her stay.        Entered by: Marj Madrigal 07/19/2019 12:54 PM

## 2019-07-19 NOTE — PROGRESS NOTES
[facility]    Hospitalist Progress Note    Date of Service (when I saw the patient): 07/19/2019    Assessment & Plan       Possible Syncope: Patient does not remember the fall. She does remember that she was on the toilet and woke up on the floor when staff called to her. She has had frequent falls over the last month or so, she relates those falls to her left hip pain and her leg suddenly giving out. She did hit her head in the fall. CT scan negative for acute bleed. MRI does not show any acute changes, possible changed related to parkinson's. SHe does have a reported history of stroke on her medical record but this is not mentioned. She has been stable on telemetry since arrival. Will continue tele, get orthostatics, fall precautions.    Right arm weakness: Patient reporting new right arm weakness and tremor. MRI this morning negative for acute changes. On exam she is not having unilateral weakness.       Chronic left hip pain: Reported acute on chronic pain 7/2 after 2 falls at home within 24 hours. Plain films at that time were negative for fractures. Since that time she states her hip has continued to give out on her causing falls. She has significant pain as well, has had SI joint injections in the past. PT/OT evaluation and will get CT of her hip as well.       Essential hypertension: In clinic she has had several normal systolic pressures 120-130's, however she was reporting feeling lightheaded and dizzy and so was weaned off her losartan. Since her arrival to the floor she has been persistently hypertensive in the 170-180's but diastolic only running in the 60's. Continue to monitor and will get orthostatics as well.       Type 2 diabetes mellitus without complication, without long-term current use of insulin (H): Continue home dose insulin and oral amaryl. Will get glucose checks QID while here to rule out hypoglycemic episodes.       Falls frequently: With fall prior to arrival she did have  head injury, small laceration, and complaints of hip pain and urinary retention. Will get trauma consult.       Urinary retention: With overflow incontinence. She is having frequent incontinent episodes, bladder scan showed >500cc urine after void overnight so she was straight cathed. Will get post-void residual this morning and place pickering if she continued to have retention. There is a concern with retention that she may have spinal injury from either this fall or her fall early in July. She has known bulging disc and spondylosis of L-spine from MRI and CT scan that were done in May 2019.      Rash: Very large pruritic rash on back and buttocks-pt thinks due to briefs she has been wearing for the last several weeks. She also has some labial skin tears and general erythema and irritation. No briefs, may need Pickering for retention which will improve healing as well. No briefs.       DVT Prophylaxis: Pneumatic Compression Devices  Code Status: DNR    Disposition: Expected discharge in 2-3 days once vitals stabilize, trauma evaluation complete, PT/OT evaluation completed with recommendations .    Maryanne Hicks, CNP    Interval History   Slept a bit, denies headache or head pain. Having left hip pain-worse then chronic pain, particularly with weight bearing. No changes in visin. Notes right arm weakness and tremor still present. Dizzy when sitting up.     -Data reviewed today: I reviewed all new labs and imaging results over the last 24 hours.     Physical Exam   Temp: 97.9  F (36.6  C) Temp src: Tympanic BP: 166/57   Heart Rate: 62 Resp: 18 SpO2: 94 % O2 Device: None (Room air)    Vitals:    07/19/19 0050   Weight: 61 kg (134 lb 7.7 oz)     Vital Signs with Ranges  Temp:  [97  F (36.1  C)-97.9  F (36.6  C)] 97.9  F (36.6  C)  Heart Rate:  [55-65] 62  Resp:  [8-57] 18  BP: (111-185)/(57-90) 166/57  SpO2:  [84 %-97 %] 94 %  I/O last 3 completed shifts:  In: 1000 [I.V.:1000]  Out: -     Peripheral IV 07/18/19 Right  Hand (Active)   Site Assessment WDL 7/19/2019  1:00 AM   Line Status Saline locked 7/19/2019  1:00 AM   Phlebitis Scale 0-->no symptoms 7/19/2019  1:00 AM   Infiltration Scale 0 7/19/2019  1:00 AM   Number of days: 1     Line/device assessment(s) completed for medical necessity    Constitutional: Awake,alert, no acute distress though seems anxious  Respiratory: Clear but diminished throughout without any wheezes, crackles or rhonchi.  Cardiovascular: HRR, no murmurs, rubs, thrills. No peripheral edema.   GI: Soft,nontender, bowel sounds positive.   Skin/Integumen: Large bruise to posterior occiput, small laceration on the top of the bruise no longer bleeding. Rasied, dry, rough rash to lower back and buttocks that is pruritic.  Neuro: Equal hand grasps, equal strength at shoulder, hip and foot.  No facial droop, slurred speech. Pupils equal and reactive.        Medications       aspirin  81 mg Oral Daily     gabapentin  100 mg Oral BID     glimepiride  6 mg Oral Daily     insulin aspart  1-7 Units Subcutaneous TID AC     insulin aspart  1-5 Units Subcutaneous At Bedtime     insulin glargine  10 Units Subcutaneous At Bedtime     lactobacillus acidophilus  1 tablet Oral Daily     sodium chloride (PF)  3 mL Intracatheter Q8H       Data   Recent Labs   Lab 07/18/19  1825 07/18/19  1812   WBC  --  8.6   HGB  --  11.6*   MCV  --  93   PLT  --  398   INR 1.02  --    NA  --  140   POTASSIUM  --  4.8   CHLORIDE  --  113*   CO2  --  19*   BUN  --  20   CR  --  0.71   ANIONGAP  --  8   DIOMEDES  --  9.4   GLC  --  169*   ALBUMIN  --  3.4   PROTTOTAL  --  7.1   BILITOTAL  --  0.1*   ALKPHOS  --  144   ALT  --  20   AST  --  16   TROPI  --  <0.015       Recent Results (from the past 24 hour(s))   CT Head w/o Contrast    Narrative    PROCEDURE: CT HEAD W/O CONTRAST     HISTORY: Head trauma, ataxia.    COMPARISON: None.    TECHNIQUE:  Helical images of the head from the foramen magnum to the  vertex were obtained without  contrast.    FINDINGS: The ventricles and sulci are normal in volume. No acute  intracranial hemorrhage, mass effect, midline shift, hydrocephalus or  basilar cystern effacement are present.    The grey-white matter interface is preserved.    The calvarium is intact. The mastoid air cells are clear.  The  visualized paranasal sinuses are clear.      Impression    IMPRESSION: No acute brain abnormality.      JOSÉ MIGUEL OCONNELL MD   Cervical spine CT w/o contrast    Narrative    PROCEDURE: CT CERVICAL SPINE W/O CONTRAST 7/18/2019 6:50 PM    HISTORY: Fall with neck pain, Fall with neck pain    COMPARISONS: None.    Meds/Dose Given:    TECHNIQUE: CT scan of the cervical spine with sagittal coronal  reconstructions    FINDINGS: There are degenerative changes at the middle and lateral  axial joint with calcification of the transverse ligament of the  atlas. There is developmental fusion of C4 and C5. Anterior posterior  osteophytes are seen at C5-C6 and C6-C7. There is a T1 compression  fracture of uncertain age. Atherosclerotic calcifications are seen at  the carotid bifurcations.         Impression    IMPRESSION: T1 compression fracture of uncertain age.    JOSÉ MIGUEL OCONNELL MD   XR Chest 2 Views    Narrative    PROCEDURE:  XR CHEST 2 VW    HISTORY:  fall.     COMPARISON:  None.    FINDINGS:   The cardiac silhouette is normal in size. The pulmonary vasculature is  normal.  The lungs are clear. No pleural effusion or pneumothorax.      Impression    IMPRESSION:  No acute cardiopulmonary disease.      JOSÉ MIGUEL OCONNELL MD   MR Brain w/o & w Contrast    Narrative    PROCEDURE: MR BRAIN W/O & W CONTRAST 7/19/2019 9:51 AM    HISTORY: Syncope, simple, abn neuro exam    COMPARISONS: None.    Meds/Dose Given:    TECHNIQUE: Images were obtained sagittally T1 weighted images were  obtained axially diffusion FLAIR gradient echo T1 and T2-weighted.  Images were obtained axially and coronally T1 weighted following  gadolinium  administration    FINDINGS: There is extensive white matter bright signal in both  hemispheres consistent with small vessel changes. There are no  enhancing masses ventricular shifts or extracerebral collection. No  acute infarcts are noted. On the gradient echo images there is  abnormal low signal seen in the basal ganglia. This is most likely on  the basis of old age and degeneration. The signal changes are  sometimes associated with Parkinson's disease. The pituitary optic  chiasm and basal cisterns appear normal. The internal auditory canals  appear normal.         Impression    IMPRESSION: Small vessel changes in both hemispheres. No acute  infarcts. Low signal on the gradient echo images in the basal ganglion  sometimes associated with Parkinson's disease. No enhancing masses or  ventricular shifts.    JOSÉ MIGUEL OCONNELL MD

## 2019-07-19 NOTE — ED NOTES
"Orthostatic BP positive.  Pt stated \"I don't feel so good,\" while sitting for BP.  MD notified.  Started IV normal saline at 125/hr, and gave PO tylenol 650 mg.    "

## 2019-07-19 NOTE — ED NOTES
"Staff cleaned patients head.  No visible lacerations.  Orthostatic BP done  (lying), 160 (sitting), 111 standing), HR 60's.  Pt did not c/o dizziness during orthostatic BP, but did state she saw something \"white floating\" in the air during standing BP.  "

## 2019-07-19 NOTE — PROGRESS NOTES
Assessment completed see flow sheet.    LOC:  Pleasantly confused    Others present: Patient     Dx: fall/syncope    Chronic Disease Management: HTN, DM2    Lives with: other residents  Living at:  [facility] in [place]  Transportation: unknown     Primary PCP: Ayaan Barnhart  Insurance:  Medicare/BCBS  Medicare: MARTINEZ letter reviewed with Nicole, am not sure she understood as she is very confused, attempted to reach son Junior and left VM to go over letter.    Support System:  Children  Homecare/PCA: [facility]  /[facility] Services:   no  [place]: NO      [facility] Referral line called: n/a    Health Care Directive: unknown, call into son to get verification.   Guardian: unknown  POA: unknown    Pharmacy: [facility]  Meds management: YES, facility manages    Adequate Resources for needs (housing, utilities, food/med): YES  Household chores: facility  Work/community/social activity: NO, does not participate in activities due to pain.    ADLs: needs assistance with all  Ambulation:was walking very little with a walker  Falls: yesterday, no known fractures  Nutrition: she states only the breakfast is good there, but other meals are poor.  Sleep: sleeps poorly in a chair due to pain    Equipment used: walker and wheelchair      Oxygen supplier: no      Does the supplier have valid oxygen orders: n/a    Mental health: no  Substance abuse: no  Exposure to violence/abuse: no  Stressors: denies    Able to Return to Prior Living Arrangements: unknown    Choice of Vendor: n/a    Barriers: patient refusing to go to STR    HERNANDEZ: none    Plan: unknown    LVM for angie Pacheco @ [phone].     Spoke with Amirah @ [facility], they are willing to take Nicole back on discharge.

## 2019-07-19 NOTE — PROGRESS NOTES
Inpatient Physical Therapy Evaluation    Name: Nicole Johnson MRN# [id number]   Age: 89 year old YOB: 1930     Date of Consultation: 2019  Consultation is requested by: Dr. Arvizu   Specific Consultation Request:  Fall   Primary care provider: Ayaan Barnhart    General Information:   Onset Date: 19    History of Current Problem/Admission: Orthostatic hypotension [I95.1]  Fall, initial encounter [W19.XXXA]  Scalp laceration, initial encounter [S01.01XA]  Syncope, unspecified syncope type [R55]    Prior Level of Function: Pt lives at an [facility]. Reports she ambulates with a 4WW. Reports multiple falls. Reports she doesn't sleep in her sleep in her bed because she can't get out.  Reports she started PT last week and the ultrasound helped her hip.   Ambulation: 1 - Assistive Equipment   Transferrin - Assistive Equipment   Toiletin - Assistive Equipment    Bathin - Not assessed  Dressin - Not assessed  Eatin - Independent   Communication: 0 - Understands/communicates without difficulty  Swallowin - swallows foods/liquids without difficulty  Cognition: 1 - attention or memory deficits    Fall history within the last 6 months: Yes - reports falling frequently     Current Living Situation: Pt lives at [facility] in [place].     Current Equipment Used at Home: 4WW, shower chair     Patient & Family Goals: Go home. Reports she will not go to a SNF because her daughter told her they are all awful.     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     Chronic left hip pain 2019     CVA (cerebral vascular accident) (H)      Essential hypertension 2018     Gastroesophageal reflux disease without esophagitis 2018     Macular retinal edema 10/18/2018     Multiple falls       Other constipation 7/30/2018     Seasonal allergic rhinitis 7/30/2018     Syncope 7/19/2019     Type 2 diabetes mellitus without complication, without long-term current use of insulin (H) 7/30/1996     Vitamin D deficiency 7/30/2018       Past Surgical History:  Past Surgical History:   Procedure Laterality Date     ARTHROPLASTY ELBOW  2011     CHOLECYSTECTOMY  1984       Medications:   Current Facility-Administered Medications   Medication     acetaminophen (TYLENOL) tablet 650 mg     aspirin EC tablet 81 mg     calcium carbonate (TUMS) chewable tablet 500 mg     glucose gel 15-30 g    Or     dextrose 50 % injection 25-50 mL    Or     glucagon injection 1 mg     diphenhydrAMINE (BENADRYL) injection 25 mg     gabapentin (NEURONTIN) capsule 100 mg     glimepiride (AMARYL) tablet 6 mg     hydrALAZINE (APRESOLINE) injection 10 mg     hydrocortisone (CORTAID) 1 % cream     insulin aspart (NovoLOG) inj (RAPID ACTING)     insulin aspart (NovoLOG) inj (RAPID ACTING)     insulin glargine (BASAGLAR KWIKPEN) injection 10 Units     lactobacillus rhamnosus (GG) (CULTURELL) capsule 1 capsule     lidocaine (LMX4) kit     lidocaine 1 % 0.1-1 mL     lidocaine HCl (XYLOCAINE) 2 % 15 mL, alum & mag hydroxide-simethicone (MYLANTA ES/MAALOX  ES) 15 mL GI Cocktail     melatonin tablet 1 mg     naloxone (NARCAN) injection 0.1-0.4 mg     ondansetron (ZOFRAN-ODT) ODT tab 4 mg    Or     ondansetron (ZOFRAN) injection 4 mg     oxyCODONE IR (ROXICODONE) half-tab 2.5 mg     polyethylene glycol (MIRALAX/GLYCOLAX) Packet 17 g     sodium chloride (PF) 0.9% PF flush 3 mL     sodium chloride (PF) 0.9% PF flush 3 mL       Weight Bearing Status: WBAT     Cognitive Status Examination:  Orientation: awake and alert   Level of Consciousness: alert and confused   Follows Commands and Answers Questions: 100% of the time  Personal Safety and Judgement: Impaired and impulsive   Memory: Short term memory impaired   Comments: Pt is confused and anxious.  Very fearful of falling. Pt has difficulty staying on topic during conversation.     Pain:   Currently in pain? Yes  Pain Location? left hip  Pain Ratin    Physical Therapy Evaluation:   Integumentary/Edema: No issues observed   ROM: WFL  Strength: BLE weakness demonstrated. Grossly 3+/5 bilaterally   Bed Mobility: Moderate assist and extra time   Transfers: Min A with FWW and cues for safety  Gait: Ambulated from bed to chair with min to mod A to prevent loss of balance limited by c/o L hip pain and pt anxiety about falling.   Stairs: NA  Balance: Poor standing balance  Sensory: NA  Coordination: Normal     Physical Therapy Interventions: Balance, Bed Mobility, Gait Training , ROM, Strengthening, Stretching , Transfer Training and Progressive Activity/Exercise     Clinical Impressions:  Criteria for Skilled Therapeutic Intervention Met: Yes, treatment indicated  PT Diagnosis: Weakness, h/o fall   Influenced by the following impairments: Cognitive impairments, pain, weakness, impaired gait, impaired balance   Functional limitations due to impairment: Decreased tolerance for functional mobility , high fall risk   Clinical presentation: Stable/Uncomplicated  Clinical presentation rationale: Clinical judgement  Clinical Decision making (complexity): Low Complexity  Frequency: 6 times/week  Predicted Duration of Therapy Intervention (days/wks): 5 days  Anticipated Discharge Disposition: Transitional Care Facility, Long Term Care Facility   Anticipated Equipment Needs at Discharge: NA  Risks and Benefits of therapy have been explained: Yes  Patient, Family & other staff in agreement with plan of care: Yes  Clinical Impression Comments: PT evaluation completed due to fall. Pt is currently not safe to return to [facility] due to weakness, poor balance and cognitive impairments. Recommend placement at short term rehab facility and pt may need more assistance in the long term. Pt is currently declining placement at a facility  however discharge back to retirement would not be safe. Plan to treat pt during her stay.     Total Eval Time:
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
## 2019-07-19 NOTE — PLAN OF CARE
"Spoke to JOE Macario from [facility] to provide update. Per Amirah, patient Gabapentin dose should be 200 mg BID - primary nurse updated PTA medlist. Amirah will get updated med list to us tomorrow. Amirah also reported attention seeking behaviors from patient. Reported patient throwing herself down on the ground when family is about to leave and when she states she wants to go to a SNF. Reports that patient began walking again after a hip ultrasound \"healed her\" and that she is only using underwear and a light pad at home, capable of holding urine, and using the toilet. Reported patient has been depressed for awhile, but refused to take anti-depressant medication. Family is strained and patient daughter \"moved to [place] for a reason.\" Will update or ask questions appropriately.     Amirah DIAZ from [facility]: [phone]  "
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
## 2019-07-19 NOTE — PLAN OF CARE
"Discharge Planner OT   Patient plan for discharge: Return to [facility]  Current status: Joseluis x 2 for sit<>stand and chair>bed, MaxA sit>supine  Barriers to return to prior living situation: weakness, cognitive impairment, impaired balance, impaired activity tolerance, high fall risk  Recommendations for discharge: Short term rehab at [facility]  Rationale for recommendations: Pt presents with impaired ADLs and would not be safe to return to the long-term. Recommend ongoing skilled OT intervention to address pt's deficits and improve her independence in ADLs. Pt currently declining STR stating she wants to return to the long-term and \"would rather die than go to a NH\". Plan to treat during pt's stay.        Entered by: Leeann Arriaga 07/19/2019 1:22 PM     "

## 2019-07-19 NOTE — PLAN OF CARE
Straight cathed patient for total of 550 odorus cloudy urine    Patients julian area with multiple lacerations to inner labia. Scant bleeding. Patient states it is burning constantly and is unable to state where the cuts came from. No signs of scratching or patient itching. Vulnerable adult filed

## 2019-07-19 NOTE — ED NOTES
Face to face report given with opportunity to observe patient.    Report given to JOE Martin   7/18/2019  7:08 PM

## 2019-07-19 NOTE — PLAN OF CARE
"Temp: 97.9  F (36.6  C) Temp src: Tympanic BP: (!) 188/66(recheck right arm)   Heart Rate: 71 Resp: 18 SpO2: 94 % O2 Device: None (Room air)       Patient VSS ex elevated BP - orthostatics performed and NP Fabiola provided results. Patient alert and oriented but high-strung/anxious. Patient LS CTAB and no SOB noted. HRR - SR 60's frequent pvc ST depression, per ICU tele. BSA tolerating CHO diet well- last BM \"two days ago\" however, patient is concerned with constipation and reports \"I used FLEETS to go so I will need a suppository.\" Milk of Mag provided. Patient julian area is reddened and raw, possibly due to consistent and frequent dribbling/incontinence and urinary retention. Julian area cleaned and pickering placed, post bladder scan >300. Patient generalized weakness and assist of two to stand or ambulate to chair - does not tolerate well. Hydrocortozone cream applied to patient lower back and left side d/t rash and irritation. BG afternoon 120; patient did not order lunch, but had a Jello prior to shift change.     Face to face report given with opportunity to observe patient.    Report given to JOE Otero   7/19/2019  4:34 PM   "

## 2019-07-19 NOTE — ED NOTES
"Per Dr. Mae, patient up to ambulate with walker in room. Patient able to sit up and then stand by self, patient walked with SBA of 2 and walker. Patient able to take about 5 steps before requesting to \"sit down\". Patient states that she feels dizzy when up and also c/o left hip pain, states \"that's where I fell\". Patient assisted back into bed. Reported to Dr. Mae.  "

## 2019-07-19 NOTE — PROGRESS NOTES
Patient admitted last night after being found down.  Suspected syncope.  Hit head.  Reviewed CXR and head and cervical CT.  Compression fracture of T1 undetermined age.  Will get a full trauma eval today.

## 2019-07-19 NOTE — PROGRESS NOTES
"Inpatient Occupational Therapy Evaluation    Name: Nicole Johnson MRN# [id number]   Age: 89 year old YOB: 1930     Date of Consultation: 2019  Consultation is requested by:  Dr. Anne  Specific Consultation Request:  ADLs  Primary care provider: Ayaan Barnhart    General Information:   Onset Date: 19    History of Current Problem/Admission: Orthostatic hypotension [I95.1]  Fall, initial encounter [W19.XXXA]  Scalp laceration, initial encounter [S01.01XA]  Syncope, unspecified syncope type [R55]    Prior Level of Function: Pt reports using a 4WW for mobility. She received assistance with bathing, LB dressing and fastening her bra (also set up clothes for her), and brushing her teeth. Pt reports \"they're supposed to\" assist her with toileting but they did not the day she fell. Pt reports starting PT last week at the [facility] and prior was using a wheelchair.   Ambulation: 1 - Assistive Equipment   Transferrin - Assistive Equipment   Toiletin - Assistive Equipment    Bathing: 3 - Assistive Equipment & Person  Dressin - Assistive Person   Eatin - Independent   Communication: 0 - Understands/communicates without difficulty  Swallowin - swallows foods/liquids without difficulty  Cognition: 1 - attention or memory deficits    Fall history within the last 6 months: Yes - \"lots of falls\" with the one yesterday being the worse     Current Living Situation: Pt lives at [facility] in [place]. 0 stairs to enter. Bathroom has a commode overlay and grab bars near the toilet. Pt uses a walk in shower with grab bars and a bench.     Current Equipment Used at Home: 4WW, commode overlay, shower bench, grab bars in the bathroom     Patient & Family Goals: Pt does not want to go to a nursing home. She stated she is 89 years old and would rather die. Pt has not heard anything good about the nursing homes and states at the [facility], they come right away when she presses her call light.      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     Chronic left hip pain 7/4/2019     CVA (cerebral vascular accident) (H) 2011     Essential hypertension 7/30/2018     Gastroesophageal reflux disease without esophagitis 7/30/2018     Macular retinal edema 10/18/2018     Multiple falls      Other constipation 7/30/2018     Seasonal allergic rhinitis 7/30/2018     Syncope 7/19/2019     Type 2 diabetes mellitus without complication, without long-term current use of insulin (H) 7/30/1996     Vitamin D deficiency 7/30/2018       Past Surgical History:  Past Surgical History:   Procedure Laterality Date     ARTHROPLASTY ELBOW  2011     CHOLECYSTECTOMY  1984       Medications:   Current Facility-Administered Medications   Medication     acetaminophen (TYLENOL) tablet 650 mg     aspirin EC tablet 81 mg     calcium carbonate (TUMS) chewable tablet 500 mg     glucose gel 15-30 g    Or     dextrose 50 % injection 25-50 mL    Or     glucagon injection 1 mg     diphenhydrAMINE (BENADRYL) injection 25 mg     gabapentin (NEURONTIN) capsule 100 mg     glimepiride (AMARYL) tablet 6 mg     hydrALAZINE (APRESOLINE) injection 10 mg     hydrocortisone (CORTAID) 1 % cream     insulin aspart (NovoLOG) inj (RAPID ACTING)     insulin aspart (NovoLOG) inj (RAPID ACTING)     insulin glargine (BASAGLAR KWIKPEN) injection 10 Units     lactobacillus rhamnosus (GG) (CULTURELL) capsule 1 capsule     lidocaine (LMX4) kit     lidocaine 1 % 0.1-1 mL     lidocaine HCl (XYLOCAINE) 2 % 15 mL, alum & mag hydroxide-simethicone (MYLANTA ES/MAALOX  ES) 15 mL GI Cocktail     melatonin tablet 1 mg     naloxone (NARCAN) injection 0.1-0.4 mg     ondansetron (ZOFRAN-ODT) ODT tab 4 mg    Or     ondansetron (ZOFRAN) injection 4 mg     oxyCODONE IR (ROXICODONE) half-tab 2.5 mg      polyethylene glycol (MIRALAX/GLYCOLAX) Packet 17 g     sodium chloride (PF) 0.9% PF flush 3 mL     sodium chloride (PF) 0.9% PF flush 3 mL       Weight Bearing Status: no restrictions      Cognitive Status Examination:  Orientation: awake and alert and oriented to time, place and person  Level of Consciousness: alert and confused  Follows Commands and Answers Questions: 100% of the time  Personal Safety and Judgement: Impaired and Impulsive  Memory: Short-term memory impaired  Comments: Pt exhibits confusion; unsure what her baseline cognition is.     Pain:   Currently in pain? Yes  Pain Location? left hip, neck, head   Pain Ratin    Occupational Therapy Evaluation:   Integumentary/Edema: pt had a laceration to the back of her head. Nursing present with clinician noticed   Range of Motion: R shoulder limited to 90* and that was difficult for pt. Remaining joints are WFL   Strength: LUE grossly 3+/5, R shoulder 2+/5, R elbow 3+ to 4-/5  Hand Strength: B gross grasp fair   Muscle Tone Assessment: no deficits     Mobility:   Transfer Skills: Joseluis x 2 sit<>stand   Bed to Chair/Chair to Bed: Joseluis x 2    Bed Mobility: Sit>supine MaxA x 1 to move BLE's  Balance: fair with use of FWW      ADLs:   Lower Body Dressing: did not have pt complete d/t staff at [facility] typically assisting pt with  Toileting: pt was going to get a catheter in after OT  Grooming: staff at [facility] completes brushing pt's teeth. Pt states she doesn't comb her hair b/c it is so short    IADLs:   Previous Responsibilities of the Patient: staff at [facility] completes  Comments:      Activities of Daily Living Analysis:   Impairments Contributing to Impaired Activities of Daily Living: Balance impaired , Cognition impaired , fear and anxiety , pain, strength decreased and activity tolerance decreased  Comments:     Occupational Therapy Interventions: ADL Retraining , balance retraining , bed mobility, cognition , ROM , strengthening , transfer training and  "progressive activity/exercise    Clinical Impressions:  Criteria for Skilled Therapeutic Intervention Met: Yes, treatment indicated  OT Diagnosis: impaired ADLs  Influenced by the following impairments: pain, impaired balance, cognitive deficits, fear and anxiety, weakness, and impaired activity tolereance  Functional limitations due to impairment: Dressing, grooming, bathing, toileting/transfers, functional mobility   Clinical presentation: Evolving/Changing  Clinical presentation rationale: clinical judgement   Clinical Decision making (complexity): Low Complexity  Frequency: 5 times/week  Predicted Duration of Therapy Intervention (days/wks): 5 days  Anticipated Discharge Disposition: Transitional Care Facility   Anticipated Equipment Needs at Discharge:   Risks and Benefits of therapy have been explained: Yes  Patient, Family & other staff in agreement with plan of care: No - pt states she doesn't want to go to a nursing home  Clinical Impression Comments: Pt presents with impaired ADLs due to weakness, impaired activity tolerance, cognitive deficits, and impaired balance. Pt would not be safe to return to the [facility], recommend ongoing skilled OT intervention to address pt's deficits and improve her independence in ADLs. Pt currently declining STR stating she wants to return to the [facility] and \"would rather die than go to a NH\".  informed. Plan to treat during pt's stay.     Total Eval Time: 15  "

## 2019-07-20 ENCOUNTER — APPOINTMENT (OUTPATIENT)
Dept: PHYSICAL THERAPY | Facility: HOSPITAL | Age: 84
DRG: 689 | End: 2019-07-20
Payer: MEDICARE

## 2019-07-20 LAB
ANION GAP SERPL CALCULATED.3IONS-SCNC: 6 MMOL/L (ref 3–14)
BACTERIA SPEC CULT: NORMAL
BUN SERPL-MCNC: 15 MG/DL (ref 7–30)
CALCIUM SERPL-MCNC: 8.8 MG/DL (ref 8.5–10.1)
CHLORIDE SERPL-SCNC: 113 MMOL/L (ref 94–109)
CO2 SERPL-SCNC: 21 MMOL/L (ref 20–32)
CREAT SERPL-MCNC: 0.63 MG/DL (ref 0.52–1.04)
ERYTHROCYTE [DISTWIDTH] IN BLOOD BY AUTOMATED COUNT: 13.9 % (ref 10–15)
GFR SERPL CREATININE-BSD FRML MDRD: 79 ML/MIN/{1.73_M2}
GLUCOSE BLDC GLUCOMTR-MCNC: 129 MG/DL (ref 70–99)
GLUCOSE BLDC GLUCOMTR-MCNC: 134 MG/DL (ref 70–99)
GLUCOSE BLDC GLUCOMTR-MCNC: 158 MG/DL (ref 70–99)
GLUCOSE BLDC GLUCOMTR-MCNC: 194 MG/DL (ref 70–99)
GLUCOSE SERPL-MCNC: 135 MG/DL (ref 70–99)
HCT VFR BLD AUTO: 34 % (ref 35–47)
HGB BLD-MCNC: 10.8 G/DL (ref 11.7–15.7)
MCH RBC QN AUTO: 30.2 PG (ref 26.5–33)
MCHC RBC AUTO-ENTMCNC: 31.8 G/DL (ref 31.5–36.5)
MCV RBC AUTO: 95 FL (ref 78–100)
PLATELET # BLD AUTO: 329 10E9/L (ref 150–450)
POTASSIUM SERPL-SCNC: 4.4 MMOL/L (ref 3.4–5.3)
RBC # BLD AUTO: 3.58 10E12/L (ref 3.8–5.2)
SODIUM SERPL-SCNC: 140 MMOL/L (ref 133–144)
SPECIMEN SOURCE: NORMAL
WBC # BLD AUTO: 7.9 10E9/L (ref 4–11)

## 2019-07-20 PROCEDURE — 12000000 ZZH R&B MED SURG/OB

## 2019-07-20 PROCEDURE — 36415 COLL VENOUS BLD VENIPUNCTURE: CPT | Performed by: NURSE PRACTITIONER

## 2019-07-20 PROCEDURE — 80048 BASIC METABOLIC PNL TOTAL CA: CPT | Performed by: NURSE PRACTITIONER

## 2019-07-20 PROCEDURE — 00000146 ZZHCL STATISTIC GLUCOSE BY METER IP

## 2019-07-20 PROCEDURE — 25000128 H RX IP 250 OP 636: Performed by: HOSPITALIST

## 2019-07-20 PROCEDURE — 25000132 ZZH RX MED GY IP 250 OP 250 PS 637: Mod: GY | Performed by: HOSPITALIST

## 2019-07-20 PROCEDURE — 85027 COMPLETE CBC AUTOMATED: CPT | Performed by: NURSE PRACTITIONER

## 2019-07-20 PROCEDURE — 25000132 ZZH RX MED GY IP 250 OP 250 PS 637: Mod: GY | Performed by: NURSE PRACTITIONER

## 2019-07-20 PROCEDURE — 97530 THERAPEUTIC ACTIVITIES: CPT | Mod: GP

## 2019-07-20 RX ADMIN — Medication 1 MG: at 21:43

## 2019-07-20 RX ADMIN — POLYETHYLENE GLYCOL 3350 17 G: 17 POWDER, FOR SOLUTION ORAL at 09:28

## 2019-07-20 RX ADMIN — GLIMEPIRIDE 6 MG: 2 TABLET ORAL at 09:29

## 2019-07-20 RX ADMIN — GABAPENTIN 200 MG: 100 CAPSULE ORAL at 09:30

## 2019-07-20 RX ADMIN — OXYCODONE HYDROCHLORIDE 2.5 MG: 5 TABLET ORAL at 06:02

## 2019-07-20 RX ADMIN — INSULIN ASPART 1 UNITS: 100 INJECTION, SOLUTION INTRAVENOUS; SUBCUTANEOUS at 17:09

## 2019-07-20 RX ADMIN — ACETAMINOPHEN 650 MG: 325 TABLET, FILM COATED ORAL at 18:12

## 2019-07-20 RX ADMIN — ACETAMINOPHEN 650 MG: 325 TABLET, FILM COATED ORAL at 08:57

## 2019-07-20 RX ADMIN — HEPARIN SODIUM 5000 UNITS: 10000 INJECTION, SOLUTION INTRAVENOUS; SUBCUTANEOUS at 09:35

## 2019-07-20 RX ADMIN — HYDROCORTISONE: 25 CREAM TOPICAL at 21:38

## 2019-07-20 RX ADMIN — GABAPENTIN 200 MG: 100 CAPSULE ORAL at 21:43

## 2019-07-20 RX ADMIN — OXYCODONE HYDROCHLORIDE 2.5 MG: 5 TABLET ORAL at 12:00

## 2019-07-20 RX ADMIN — INSULIN GLARGINE 10 UNITS: 100 INJECTION, SOLUTION SUBCUTANEOUS at 21:47

## 2019-07-20 RX ADMIN — HEPARIN SODIUM 5000 UNITS: 10000 INJECTION, SOLUTION INTRAVENOUS; SUBCUTANEOUS at 00:37

## 2019-07-20 RX ADMIN — HYDROCORTISONE: 25 CREAM TOPICAL at 13:11

## 2019-07-20 RX ADMIN — ASPIRIN 81 MG: 81 TABLET, COATED ORAL at 09:30

## 2019-07-20 RX ADMIN — AMLODIPINE BESYLATE 5 MG: 5 TABLET ORAL at 09:29

## 2019-07-20 RX ADMIN — Medication 1 CAPSULE: at 09:30

## 2019-07-20 RX ADMIN — OXYCODONE HYDROCHLORIDE 2.5 MG: 5 TABLET ORAL at 23:31

## 2019-07-20 RX ADMIN — HEPARIN SODIUM 5000 UNITS: 10000 INJECTION, SOLUTION INTRAVENOUS; SUBCUTANEOUS at 17:03

## 2019-07-20 RX ADMIN — HYDROCORTISONE: 25 CREAM TOPICAL at 09:37

## 2019-07-20 RX ADMIN — OXYCODONE HYDROCHLORIDE 2.5 MG: 5 TABLET ORAL at 18:12

## 2019-07-20 RX ADMIN — INSULIN ASPART 2 UNITS: 100 INJECTION, SOLUTION INTRAVENOUS; SUBCUTANEOUS at 13:07

## 2019-07-20 RX ADMIN — DIPHENHYDRAMINE HYDROCHLORIDE 25 MG: 50 INJECTION, SOLUTION INTRAMUSCULAR; INTRAVENOUS at 17:01

## 2019-07-20 RX ADMIN — OXYCODONE HYDROCHLORIDE 2.5 MG: 5 TABLET ORAL at 00:34

## 2019-07-20 RX ADMIN — CALCIUM CARBONATE (ANTACID) CHEW TAB 500 MG 500 MG: 500 CHEW TAB at 19:38

## 2019-07-20 RX ADMIN — SODIUM CHLORIDE, SODIUM LACTATE, POTASSIUM CHLORIDE, CALCIUM CHLORIDE AND DEXTROSE MONOHYDRATE: 5; 600; 310; 30; 20 INJECTION, SOLUTION INTRAVENOUS at 00:36

## 2019-07-20 RX ADMIN — ACETAMINOPHEN 650 MG: 325 TABLET, FILM COATED ORAL at 04:23

## 2019-07-20 ASSESSMENT — ACTIVITIES OF DAILY LIVING (ADL)
ADLS_ACUITY_SCORE: 20
ADLS_ACUITY_SCORE: 19
ADLS_ACUITY_SCORE: 23
ADLS_ACUITY_SCORE: 17
ADLS_ACUITY_SCORE: 17
ADLS_ACUITY_SCORE: 20

## 2019-07-20 NOTE — PROGRESS NOTES
Cross Cover    FINDINGS: There is a fracture of the greater trochanter. The femoral  head and neck are intact. The acetabulum appears intact. There is an  old ischial fracture with residual deformity.                                                                        IMPRESSION: Fracture of the greater trochanter.    Will make NPO at midnight  Ortho consult placed  Davide Anne      Addendum  Discussed results with patient  She has lots of anxiety and many questions that I answered  No Ortho coverage this weekend  Options would be transfer to [facility] if she were to need surgical intervention or keep locally for Ortho consult on Monday    suspect nonoperative management since there is minimal displacement but will need formal Ortho review. This can be arranged in morning as there is no urgency.   Bedrest and non weight bearing for now  Pain control  Carrera placed  Heparin for dvt ppx    Her son has been notified    Davide Anne MD

## 2019-07-20 NOTE — PLAN OF CARE
Reason for hospital stay: Syncope Hip Fx  Most recent vitals: BP (!) 182/56   Temp 98.3  F (36.8  C) (Tympanic)   Resp 16   Wt 61 kg (134 lb 7.7 oz)   SpO2 94%   Pain Management:  Oxy given for c/o hip pain with good relief, sleeping on reassessment  Orientation:  A/O, concerns with having to have surgery, states I am 89 ears old I am too old for surgery  Cardiac:  HR irregular  Respiratory:  Lung sounds clear et diminished  GI:  Bowel sounds audible et active x4  :  WDL  Skin Issues:  Intact  IVF:  D5LR 75/hr  ABX:  n/a  Mobility:  Bedrest  Safety:  A/O calls for assist appropriately   Comments:      7/20/2019  4:03 AM  Felicia Argueta  Face to face report given with opportunity to observe patient.    Report given to Jane Argueta   7/20/2019  7:02 AM

## 2019-07-20 NOTE — PLAN OF CARE
Per dr meli cade for staff nurse to call son Junior and discuss results of CT & fracture that was found. Talked with son and informed that pt treatment plan will be discussed in the morning. Son stated understanding and had no further questions

## 2019-07-20 NOTE — PLAN OF CARE
Discharge Planner PT   Patient plan for discharge: SNF  Current status: Pt supine in bed at start of treatment. Pt had questions about her hip fracture. Provided education on non-weight bearing status. Pt was disappointed that she can't walk. Transferred from supine to sit with mod A. From edge of bed attempted sit to stand with walker , non-weight bearing on L however pt unable to stand while maintaining NWB status. Used tomasa lift to transfer pt to recliner. Once in recliner pt participated in seated exerccises including ankle pumps, LAQ, hip flexion on R  Barriers to return to prior living situation: NWB status, tomasa lift, cognition, weakness  Recommendations for discharge: SNF  Rationale for recommendations: Mobility significantly impaired due to general weakness and hip fracture on L. Pt currently unable to maintain NWB status during transfers without use of tomasa. Pt will need placement at SNF.         Entered by: Marj Madrigal 07/20/2019 10:53 AM

## 2019-07-20 NOTE — PLAN OF CARE
Reason for hospital stay:  Syncope/ Hip fracture    Most recent vitals: /55   Temp 98.2  F (36.8  C) (Temporal)   Resp 16   Wt 60.1 kg (132 lb 7.9 oz)   SpO2 92%   Pain Management:  C/o left hip pain, administered Tylenol and Zulay with Pt sleeping on recheck  Orientation:  A&O. Neuro's in tact  Cardiac:  HR irr. Telemetry discontinued this shift  Respiratory:  LS dim and clear. Denies sob  GI:  BS active. Denies nausea. No BM this shift, Miralax given per Pt request  :  Carrera catheter in place for retention  Diet: CHO,  and 196 coverage given as ordered. Minimal appetite  Skin Issues:  Scattered bruising, rash to low back- cream applied, scabs  IVF:  IV SL  ABX:  None  Mobility:  Morgan lift following PT. Non-weight bearing to the LLE  Safety:  Call light within reach, bed and chair alarm active    Comments:    7/20/2019  2:01 PM  Jane Christopher    Face to face report given with opportunity to observe patient.    Report given to JOE Kong   7/20/2019  3:09 PM

## 2019-07-20 NOTE — PLAN OF CARE
Call returned to [facility] at [facility] in [place] where the Pt is a resident. Updated on Pt's plan of care and findings of a hip fracture.

## 2019-07-20 NOTE — PROGRESS NOTES
[facility]    Hospitalist Progress Note    Date of Service (when I saw the patient): 07/20/2019    Assessment & Plan       Possible Syncope: Patient does not remember the fall. She does remember that she was on the toilet and woke up on the floor when staff called to her. She has had frequent falls over the last month or so, she relates those falls to her left hip pain and her leg suddenly giving out. She did hit her head in the fall. CT scan negative for acute bleed. MRI does not show any acute changes, possible changed related to parkinson's. She does have a reported history of stroke on her medical record but this is not mentioned. She has been stable on telemetry since arrival,will discontinue today. Continue fall precautions.      Chronic left hip pain: Reported acute on chronic pain 7/2 after 2 falls at home within 24 hours. Plain films at that time were negative for fractures. Since that time she states her hip has continued to give out on her causing falls. She has significant pain as well, has had SI joint injections in the past. CT of hip shows greater trochanter fracture. Suspect this was sustained in first 2 falls beginning of July-though x-rays at the time did not show a fracture by report. This then possibly extended and caused future falls with the leg giving out when she attempted to weight bear. No surgical intervention needed as nondisplaced, but will need to be nonweightbearing for 6 weeks. Will need SNF placement.       Essential hypertension: In clinic she has had several normal systolic pressures 120-130's, however she was reporting feeling lightheaded and dizzy and so was weaned off her losartan. Since her arrival to the floor she has been persistently hypertensive in the 170-180's but diastolic only running in the 60's. She does have orthostatic hypotension with a change in systolic from 180 to 150. Will place teds to help venous return.        Type 2 diabetes mellitus without  complication, without long-term current use of insulin (H): Continue home dose insulin and oral amaryl. She has not had any hypoglycemia.       Falls frequently: With fall prior to arrival she did have head injury, small laceration, and complaints of hip pain and urinary retention. Trauma consult completed.        Urinary retention: With overflow incontinence. She is having frequent incontinent episodes, bladder scan showed >500cc urine after void overnight so she was straight cathed. Will get post-void residual this morning and place pickering if she continued to have retention. There is a concern with retention that she may have spinal injury from either this fall or her fall early in July. She has known bulging disc and spondylosis of L-spine from MRI and CT scan that were done in May 2019. Repeat CT of lumbar spine shows known degenerative changes but no new fractures on central canal stenosis.    Right arm weakness: Patient reporting new right arm weakness and tremor. MRI this morning negative for acute changes. On exam she is not having unilateral weakness. 7/20 feels weakness and tremor have improved-perhaps soft tissue injury from fall.     Rash: Very large pruritic rash on back and buttocks-pt thinks due to briefs she has been wearing for the last several weeks. She also has some labial skin tears and general erythema and irritation. No briefs, Pickering today, will plan on discontinue tomorrow.    DVT Prophylaxis: Pneumatic Compression Devices  Code Status: DNR    Disposition: Expected discharge in 2-3 days once vitals stabilize, trauma evaluation complete, PT/OT evaluation completed with recommendations .    Maryanne Hicks, CNP    Interval History   Slept a bit, denies headache or head pain. Having left hip pain-worse then chronic pain, particularly with weight bearing. No changes in visin. Notes right arm weakness and tremor still present. Dizzy when sitting up.     -Data reviewed today: I reviewed all new  labs and imaging results over the last 24 hours.     Physical Exam   Temp: 98.2  F (36.8  C) Temp src: Temporal BP: (!) 165/48   Heart Rate: 62 Resp: 16 SpO2: 93 % O2 Device: None (Room air)    Vitals:    07/19/19 0050 07/20/19 0422   Weight: 61 kg (134 lb 7.7 oz) 60.1 kg (132 lb 7.9 oz)     Vital Signs with Ranges  Temp:  [97  F (36.1  C)-98.3  F (36.8  C)] 98.2  F (36.8  C)  Heart Rate:  [59-71] 62  Resp:  [16-17] 16  BP: (165-207)/(48-66) 165/48  SpO2:  [91 %-94 %] 93 %  I/O last 3 completed shifts:  In: 420 [P.O.:420]  Out: 650 [Urine:650]    Peripheral IV 07/18/19 Right Hand (Active)   Site Assessment WDL 7/20/2019  9:00 AM   Line Status Saline locked;Checked every 1-2 hour 7/20/2019  9:00 AM   Phlebitis Scale 0-->no symptoms 7/20/2019  9:00 AM   Infiltration Scale 0 7/20/2019  9:00 AM   Number of days: 2       Urethral Catheter Straight-tip;Non-latex 16 fr (Active)   Tube Description Positional 7/20/2019  9:00 AM   Catheter Care Done 7/20/2019  9:00 AM   Collection Container Standard 7/20/2019  9:00 AM   Securement Method Securing device (Describe) 7/20/2019  9:00 AM   Rationale for Continued Use Retention 7/20/2019  9:00 AM   Urine Output 300 mL 7/19/2019 10:43 PM   Number of days: 1     Line/device assessment(s) completed for medical necessity    Constitutional: Awake,alert, no acute distress though seems anxious  Respiratory: Clear but diminished throughout without any wheezes, crackles or rhonchi.  Cardiovascular: HRR, no murmurs, rubs, thrills. No peripheral edema.   GI: Soft,nontender, bowel sounds positive.   Skin/Integumen: Large bruise to posterior occiput, small laceration on the top of the bruise no longer bleeding. Rasied, dry, rough rash to lower back and buttocks that is pruritic.  Neuro: Equal hand grasps, equal strength at shoulder, hip and foot.  No facial droop, slurred speech. Pupils equal and reactive.        Medications       amLODIPine  5 mg Oral Daily     aspirin  81 mg Oral Daily      gabapentin  200 mg Oral BID     glimepiride  6 mg Oral Daily     heparin  5,000 Units Subcutaneous Q8H     hydrocortisone   Topical TID     insulin aspart  1-7 Units Subcutaneous TID AC     insulin aspart  1-5 Units Subcutaneous At Bedtime     insulin glargine  10 Units Subcutaneous At Bedtime     lactobacillus rhamnosus (GG)  1 capsule Oral Daily     sodium chloride (PF)  3 mL Intracatheter Q8H       Data   Recent Labs   Lab 07/20/19  0606 07/18/19  1825 07/18/19  1812   WBC 7.9  --  8.6   HGB 10.8*  --  11.6*   MCV 95  --  93     --  398   INR  --  1.02  --      --  140   POTASSIUM 4.4  --  4.8   CHLORIDE 113*  --  113*   CO2 21  --  19*   BUN 15  --  20   CR 0.63  --  0.71   ANIONGAP 6  --  8   DIOMEDES 8.8  --  9.4   *  --  169*   ALBUMIN  --   --  3.4   PROTTOTAL  --   --  7.1   BILITOTAL  --   --  0.1*   ALKPHOS  --   --  144   ALT  --   --  20   AST  --   --  16   TROPI  --   --  <0.015       Recent Results (from the past 24 hour(s))   CT Lumbar Spine w/o Contrast    Narrative    PROCEDURE: CT LUMBAR SPINE W/O CONTRAST 7/19/2019 4:35 PM    HISTORY: Radiculopathy, > 6wks conservative tx, persistent sx    COMPARISONS: None.    Meds/Dose Given:    TECHNIQUE: CT scan of the lumbar spine with sagittal coronal  reconstructions    FINDINGS: The T11 T12 T12 L1 L1 L2 discs appear normal. There is  gaseous degeneration and loss of vertical disc space height noted at  L2-L3 facet joint degenerative changes are noted. There is decrease in  height in the L3-L4 disc broad-based annular bulging degenerative  osteophyte formation and facet joint degenerative change. This results  in moderate central spinal stenosis at L3-L4. At L4-L5 there is  gaseous degeneration of the disc and facet joint degenerative change  and broad-based annular bulging. At L5-S1 there is loss of vertical  disc space height noted facet joint degenerative changes. There are no  fractures of the vertebral bodies or arches are noted.          Impression    IMPRESSION: Moderate degenerative changes of the lumbar spine no acute  fracture.    JOSÉ MIGUEL OCONNELL MD   CT Hip Left w/o Contrast    Narrative    PROCEDURE: CT HIP LEFT W/O CONTRAST 7/19/2019 4:36 PM    HISTORY: Hip trauma, fx known or suspected, xray insufficient; Acute  on chronic hip pain after fall 7/2, frequent falls afterwards, urinary  retention    COMPARISONS: None.    Meds/Dose Given:    TECHNIQUE: CT scan of the left hip with sagittal coronal  reconstructions    FINDINGS: There is a fracture of the greater trochanter. The femoral  head and neck are intact. The acetabulum appears intact. There is an  old ischial fracture with residual deformity.         Impression    IMPRESSION: Fracture of the greater trochanter.    JOSÉ MIGUEL OCONNELL MD

## 2019-07-20 NOTE — PROGRESS NOTES
Maryanne, Hospitalist NP advised that patient needs SNF and will be non-weight bearing for 6 weeks. She states patient deferred choice of vendor for SNF to son, Junior. Per discharge planning, the choice of vendor list has been provided to the patient/family for a skilled nursing facility.    First Choice : Skilled Nursing Facility [place]: [facility]    Second Choice: Skilled Nursing Facility [place]: [facility]    Third Choice: Skilled Nursing Facility [facility]    [phone]     Fourth Choice: [facility].     Referrals sent. Junior would like agency transportation for patient as she is non-weight bearing and verbalizes an understanding of the private pay and that they will be billed for that service. He would like [employer] for that ride.

## 2019-07-20 NOTE — PLAN OF CARE
Reason for hospital stay:  asthma    Most recent vitals: BP (!) 207/61   Temp 97  F (36.1  C) (Tympanic)   Resp 17   Wt 61 kg (134 lb 7.7 oz)   SpO2 92%   Pain Management:  scheduled tylenol & oxy 2.5mg for hip and back pain  Orientation:  wnl  Cardiac:  wnl  Respiratory:  lung sounds clear dim  GI:  bowel sounds active  :  pickering draining mary urine  Diet:npo  Skin Issues:  hives lower back  IVF: sl  ABX:  none  Mobility:  bedrest  Safety:  bed alarm on call light within reach    Comments:    7/19/2019  11:10 PM  GRIFFIN VALERO

## 2019-07-21 LAB
GLUCOSE BLDC GLUCOMTR-MCNC: 103 MG/DL (ref 70–99)
GLUCOSE BLDC GLUCOMTR-MCNC: 155 MG/DL (ref 70–99)
GLUCOSE BLDC GLUCOMTR-MCNC: 165 MG/DL (ref 70–99)
GLUCOSE BLDC GLUCOMTR-MCNC: 214 MG/DL (ref 70–99)
GLUCOSE BLDC GLUCOMTR-MCNC: 94 MG/DL (ref 70–99)

## 2019-07-21 PROCEDURE — 99232 SBSQ HOSP IP/OBS MODERATE 35: CPT | Performed by: NURSE PRACTITIONER

## 2019-07-21 PROCEDURE — 25000132 ZZH RX MED GY IP 250 OP 250 PS 637: Mod: GY | Performed by: NURSE PRACTITIONER

## 2019-07-21 PROCEDURE — 25000128 H RX IP 250 OP 636: Performed by: NURSE PRACTITIONER

## 2019-07-21 PROCEDURE — 25000132 ZZH RX MED GY IP 250 OP 250 PS 637: Mod: GY | Performed by: HOSPITALIST

## 2019-07-21 PROCEDURE — 12000000 ZZH R&B MED SURG/OB

## 2019-07-21 PROCEDURE — 25800025 ZZH RX 258: Performed by: HOSPITALIST

## 2019-07-21 PROCEDURE — 25000128 H RX IP 250 OP 636: Performed by: HOSPITALIST

## 2019-07-21 PROCEDURE — 00000146 ZZHCL STATISTIC GLUCOSE BY METER IP

## 2019-07-21 RX ORDER — ZINC OXIDE 20 %
OINTMENT (GRAM) TOPICAL
Status: DISCONTINUED | OUTPATIENT
Start: 2019-07-21 | End: 2019-07-24 | Stop reason: HOSPADM

## 2019-07-21 RX ORDER — CEFDINIR 300 MG/1
300 CAPSULE ORAL EVERY 12 HOURS SCHEDULED
Status: DISCONTINUED | OUTPATIENT
Start: 2019-07-21 | End: 2019-07-23 | Stop reason: ALTCHOICE

## 2019-07-21 RX ORDER — GLIMEPIRIDE 2 MG/1
4 TABLET ORAL DAILY
Status: DISCONTINUED | OUTPATIENT
Start: 2019-07-21 | End: 2019-07-22

## 2019-07-21 RX ORDER — FAMOTIDINE 20 MG/1
20 TABLET, FILM COATED ORAL 2 TIMES DAILY
Status: DISCONTINUED | OUTPATIENT
Start: 2019-07-21 | End: 2019-07-24 | Stop reason: HOSPADM

## 2019-07-21 RX ORDER — INSULIN GLARGINE 100 [IU]/ML
10 INJECTION, SOLUTION SUBCUTANEOUS AT BEDTIME
Status: DISCONTINUED | OUTPATIENT
Start: 2019-07-22 | End: 2019-07-22

## 2019-07-21 RX ORDER — INSULIN GLARGINE 100 [IU]/ML
5 INJECTION, SOLUTION SUBCUTANEOUS AT BEDTIME
Status: COMPLETED | OUTPATIENT
Start: 2019-07-21 | End: 2019-07-21

## 2019-07-21 RX ORDER — CALCIUM CARBONATE 500 MG/1
500 TABLET, CHEWABLE ORAL 3 TIMES DAILY PRN
Status: DISCONTINUED | OUTPATIENT
Start: 2019-07-21 | End: 2019-07-24 | Stop reason: HOSPADM

## 2019-07-21 RX ADMIN — GLIMEPIRIDE 4 MG: 2 TABLET ORAL at 12:16

## 2019-07-21 RX ADMIN — FAMOTIDINE 20 MG: 20 TABLET, FILM COATED ORAL at 20:34

## 2019-07-21 RX ADMIN — CALCIUM CARBONATE (ANTACID) CHEW TAB 500 MG 500 MG: 500 CHEW TAB at 12:12

## 2019-07-21 RX ADMIN — INSULIN ASPART 2 UNITS: 100 INJECTION, SOLUTION INTRAVENOUS; SUBCUTANEOUS at 12:14

## 2019-07-21 RX ADMIN — ZINC OXIDE: 200 OINTMENT TOPICAL at 14:33

## 2019-07-21 RX ADMIN — OXYCODONE HYDROCHLORIDE 2.5 MG: 5 TABLET ORAL at 09:22

## 2019-07-21 RX ADMIN — OXYCODONE HYDROCHLORIDE 2.5 MG: 5 TABLET ORAL at 22:02

## 2019-07-21 RX ADMIN — HYDROCORTISONE: 25 CREAM TOPICAL at 20:33

## 2019-07-21 RX ADMIN — Medication 1 CAPSULE: at 09:22

## 2019-07-21 RX ADMIN — OXYCODONE HYDROCHLORIDE 2.5 MG: 5 TABLET ORAL at 15:51

## 2019-07-21 RX ADMIN — POLYETHYLENE GLYCOL 3350 17 G: 17 POWDER, FOR SOLUTION ORAL at 09:21

## 2019-07-21 RX ADMIN — GABAPENTIN 200 MG: 100 CAPSULE ORAL at 20:34

## 2019-07-21 RX ADMIN — ZINC OXIDE: 200 OINTMENT TOPICAL at 21:33

## 2019-07-21 RX ADMIN — ASPIRIN 81 MG: 81 TABLET, COATED ORAL at 09:22

## 2019-07-21 RX ADMIN — ACETAMINOPHEN 650 MG: 325 TABLET, FILM COATED ORAL at 03:03

## 2019-07-21 RX ADMIN — HEPARIN SODIUM 5000 UNITS: 10000 INJECTION, SOLUTION INTRAVENOUS; SUBCUTANEOUS at 09:25

## 2019-07-21 RX ADMIN — HYDROCORTISONE: 25 CREAM TOPICAL at 09:33

## 2019-07-21 RX ADMIN — GABAPENTIN 200 MG: 100 CAPSULE ORAL at 09:22

## 2019-07-21 RX ADMIN — FAMOTIDINE 20 MG: 20 TABLET, FILM COATED ORAL at 15:50

## 2019-07-21 RX ADMIN — ACETAMINOPHEN 650 MG: 325 TABLET, FILM COATED ORAL at 15:50

## 2019-07-21 RX ADMIN — INSULIN GLARGINE 5 UNITS: 100 INJECTION, SOLUTION SUBCUTANEOUS at 21:40

## 2019-07-21 RX ADMIN — CEFDINIR 300 MG: 300 CAPSULE ORAL at 10:35

## 2019-07-21 RX ADMIN — INSULIN ASPART 1 UNITS: 100 INJECTION, SOLUTION INTRAVENOUS; SUBCUTANEOUS at 17:13

## 2019-07-21 RX ADMIN — HYDROCORTISONE: 25 CREAM TOPICAL at 14:32

## 2019-07-21 RX ADMIN — ENOXAPARIN SODIUM 40 MG: 40 INJECTION SUBCUTANEOUS at 20:34

## 2019-07-21 RX ADMIN — CEFDINIR 300 MG: 300 CAPSULE ORAL at 20:34

## 2019-07-21 RX ADMIN — AMLODIPINE BESYLATE 5 MG: 5 TABLET ORAL at 09:22

## 2019-07-21 ASSESSMENT — ACTIVITIES OF DAILY LIVING (ADL)
ADLS_ACUITY_SCORE: 19
ADLS_ACUITY_SCORE: 21
ADLS_ACUITY_SCORE: 19

## 2019-07-21 NOTE — PLAN OF CARE
Reason for hospital stay:  Syncope/Hip Fx  Most recent vitals: /54   Temp 98.2  F (36.8  C)   Resp 16   Wt 60.1 kg (132 lb 7.9 oz)   SpO2 93%   Pain Management:  Oxycodone et Tylenol given for c/o pain with fair relief, repositioned q2hrs as res tolerated, refused ICE to hip states too big et cold  Orientation:  A/O but sad et withdrawn   Cardiac:  HR irregular  Respiratory:  Lung sounds clear bilat  GI:  Bowel sounds audible et active x4  :  Carrera patent, output adequate  Skin Issues:  Laceration to back of head other wise skin intact  IVF:  Saline locked  ABX:  n/a  Mobility:  Bedbound  Safety:  A/O calls for assist appropriately  Comments:      7/21/2019  5:44 AM  Felicia Argueta  Face to face report given with opportunity to observe patient.    Report given to Jane Argueta   7/21/2019  6:54 AM

## 2019-07-21 NOTE — PLAN OF CARE
Face to face report given with opportunity to observe patient.    Report given to JOE Duarte   7/20/2019  10:58 PM

## 2019-07-21 NOTE — PROGRESS NOTES
[facility]    Hospitalist Progress Note    Date of Service (when I saw the patient): 07/21/2019    Assessment & Plan       Possible Syncope: Patient does not remember the fall. She does remember that she was on the toilet and woke up on the floor when staff called to her. She has demonstrated short term memory problems since her arrival, this may be chronic. She has had frequent falls over the last month or so, she relates those falls to her left hip pain and her leg suddenly giving out. She did hit her head in the fall. CT scan negative for acute bleed. MRI does not show any acute changes, possible changed related to parkinson's. She does have a reported history of stroke on her medical record but this is not mentioned. She was  stable on telemetry x48 hours without ectopy, discontinued 7/20.  Continue fall precautions.      Chronic left hip pain: Reported acute on chronic pain 7/2 after 2 falls at home within 24 hours. Plain films at that time were negative for fractures. Since that time she states her hip has continued to give out on her causing falls. She has significant pain as well, has had SI joint injections in the past. CT of hip shows greater trochanter fracture. Suspect this was sustained in first 2 falls beginning of July-though x-rays at the time did not show a fracture by report. This then possibly extended and caused future falls with the leg giving out when she attempted to weight bear. No surgical intervention needed as nondisplaced, but will need to be nonweightbearing for 6 weeks. Will need SNF placement.       Essential hypertension: In clinic she has had several normal systolic pressures 120-130's, however she was reporting feeling lightheaded and dizzy and so was weaned off her losartan. Since her arrival to the floor she has been persistently hypertensive in the 170-180's but diastolic only running in the 50-60's. She does have orthostatic hypotension with a change in systolic from  180 to 150. Will place teds to help venous return.        Type 2 diabetes mellitus without complication, without long-term current use of insulin (H): Continue home dose insulin and oral amaryl. She was hypoglycemic this morning, will decrease amaryl from 6mg to 4mg. This may be an issue for her at home as well.       UTI: Culture on urine from ED shows >100K colony hemolytic strep, will start omnicef, discontinue Pickering catheter to see if she can urinate on her own.      Falls frequently: With fall prior to arrival she did have head injury, small laceration, and complaints of hip pain and urinary retention. Trauma consult completed.        Urinary retention: With overflow incontinence. She is having frequent incontinent episodes, bladder scan showed >500cc urine after void overnight so she was straight cathed. Pickering placed 7/19. There was a concern with retention that she may have spinal injury from either this fall or her fall early in July. She has known bulging disc and spondylosis of L-spine from MRI and CT scan that were done in May 2019. Repeat CT of lumbar spine shows known degenerative changes but no new fractures on central canal stenosis. Will discontinue pickering today and see if she is able to void adequately without it.     Right arm weakness:Resolved.  Patient reporting new right arm weakness and tremor. MRI this morning negative for acute changes. On exam she is not having unilateral weakness. 7/20 feels weakness and tremor have improved-perhaps soft tissue injury from fall.     Rash: Very large pruritic rash on back and buttocks-pt thinks due to briefs she has been wearing for the last several weeks-she does have known latex allergy. She also has some labial irritation and excoriation and general erythema-likely combination of continual overflow incontinence, contact dermatitis from briefs and the UTI causing irritation.  She does have a scar on the right labia with some redundant tissue with a small  cavity that is not tunnwling and did not have any pustulant material when probed with cotton swab. No briefs, will discontinue pickering today,continue with julian cares desitin to labia/peria area and hydrocortisone cream to back rash.    DVT Prophylaxis: Pneumatic Compression Devices  Code Status: DNR    Disposition: Expected discharge when placement available.     Maryanne RAGHAV Hicks, CNP    Interval History   Slept a bit, denies headache or head pain. No pain this morning, tolerating oxycodone. Making statements this morning that she is too old and tired, just wants to die. She is not wanting to eat much this morning, but is taking her prescribed medications and allowing other cares.     -Data reviewed today: I reviewed all new labs and imaging results over the last 24 hours.     Physical Exam   Temp: 99.1  F (37.3  C) Temp src: Tympanic BP: 148/55   Heart Rate: 57 Resp: 16 SpO2: (!) 90 % O2 Device: None (Room air)    Vitals:    07/19/19 0050 07/20/19 0422 07/21/19 0554   Weight: 61 kg (134 lb 7.7 oz) 60.1 kg (132 lb 7.9 oz) 61.4 kg (135 lb 5.8 oz)     Vital Signs with Ranges  Temp:  [98.2  F (36.8  C)-99.1  F (37.3  C)] 99.1  F (37.3  C)  Heart Rate:  [57-59] 57  Resp:  [16] 16  BP: (148-183)/(54-62) 148/55  SpO2:  [90 %-93 %] 90 %  I/O last 3 completed shifts:  In: 1243 [P.O.:620; I.V.:623]  Out: 750 [Urine:750]    Peripheral IV 07/18/19 Right Hand (Active)   Site Assessment WDL 7/21/2019  7:56 AM   Line Status Saline locked;Checked every 1-2 hour 7/21/2019  7:56 AM   Phlebitis Scale 0-->no symptoms 7/21/2019  7:56 AM   Infiltration Scale 0 7/21/2019  7:56 AM   Number of days: 3       Urethral Catheter Straight-tip;Non-latex 16 fr (Active)   Tube Description Positional 7/20/2019 11:34 PM   Catheter Care Done 7/20/2019 11:34 PM   Collection Container Standard 7/20/2019 11:34 PM   Securement Method Securing device (Describe) 7/20/2019 11:34 PM   Rationale for Continued Use Retention 7/20/2019 11:34 PM   Urine Output  450 mL 7/21/2019  5:56 AM   Number of days: 2     Line/device assessment(s) completed for medical necessity    Constitutional: Awake,alert, no acute distress though flat affect and depressed mood  Respiratory: Clear but diminished throughout without any wheezes, crackles or rhonchi.  Cardiovascular: HRR, no murmurs, rubs, thrills. No peripheral edema.   GI: Soft,nontender, bowel sounds positive.   Skin/Integumen: Large bruise to posterior occiput, small laceration on the top of the bruise no longer bleeding. Raised, dry, rough rash to lower back and buttocks that is pruritic but significantly improved. She also has some labial irritation and excoriation and general erythema-likely combination of continual overflow incontinence, contact dermatitis from briefs and the UTI causing irritation.  She does have a scar on the right labia with some redundant tissue with a small cavity that is not tunnwling and did not have any pustulant material when probed with cotton swab.  Neuro: Equal hand grasps, equal strength at shoulder, hip and foot.  No facial droop or slurred speech. Pupils equal and reactive.        Medications       amLODIPine  5 mg Oral Daily     aspirin  81 mg Oral Daily     cefdinir  300 mg Oral Q12H TEMI     enoxaparin  40 mg Subcutaneous Q24H     gabapentin  200 mg Oral BID     glimepiride  6 mg Oral Daily     hydrocortisone   Topical TID     insulin aspart  1-7 Units Subcutaneous TID AC     insulin aspart  1-5 Units Subcutaneous At Bedtime     insulin glargine  10 Units Subcutaneous At Bedtime     lactobacillus rhamnosus (GG)  1 capsule Oral Daily     sodium chloride (PF)  3 mL Intracatheter Q8H       Data   Recent Labs   Lab 07/20/19  0606 07/18/19  1825 07/18/19  1812   WBC 7.9  --  8.6   HGB 10.8*  --  11.6*   MCV 95  --  93     --  398   INR  --  1.02  --      --  140   POTASSIUM 4.4  --  4.8   CHLORIDE 113*  --  113*   CO2 21  --  19*   BUN 15  --  20   CR 0.63  --  0.71   ANIONGAP 6  --   8   DIOMEDES 8.8  --  9.4   *  --  169*   ALBUMIN  --   --  3.4   PROTTOTAL  --   --  7.1   BILITOTAL  --   --  0.1*   ALKPHOS  --   --  144   ALT  --   --  20   AST  --   --  16   TROPI  --   --  <0.015       No results found for this or any previous visit (from the past 24 hour(s)).

## 2019-07-21 NOTE — PLAN OF CARE
Reason for hospital stay:  Syncope, Hip Fracture    Most recent vitals: /55   Temp 99.1  F (37.3  C) (Tympanic)   Resp 16   Wt 61.4 kg (135 lb 5.8 oz)   SpO2 (!) 90%   Pain Management:  C/o left hip soreness, administered Zulay with decrease  Orientation:  A&O, anxious at times. Praying to the Lord to let her go home to [place]  Cardiac:  HR irr  Respiratory:  LS clear. Denies sob  GI:  BS active. Denies nausea. Watery BM x1. C/o heart burn, administered Tums  :  Carrera catheter removed this afternoon, due to void by 1800 then bladder scan  Diet: CHO.  with breakfast, no coverage.  with lunch, coverage given and new ordered dose of Amaryl per provider  Skin Issues:  Rash to low back, more noticeable to left side. Excoriated groin with an open area to the right labia. Scattered bruising. Laceration to back of head  IVF:  IV SL  ABX:  Omnicef po  Mobility:  Morgan lift to chair and BSC  Safety:  Call light within reach, bed alarm active    Comments:    7/21/2019  2:39 PM  Jane Christopher    Face to face report given with opportunity to observe patient.    Report given to JOE Moulton   7/21/2019  3:22 PM

## 2019-07-22 ENCOUNTER — APPOINTMENT (OUTPATIENT)
Dept: PHYSICAL THERAPY | Facility: HOSPITAL | Age: 84
DRG: 689 | End: 2019-07-22
Payer: MEDICARE

## 2019-07-22 ENCOUNTER — APPOINTMENT (OUTPATIENT)
Dept: OCCUPATIONAL THERAPY | Facility: HOSPITAL | Age: 84
DRG: 689 | End: 2019-07-22
Payer: MEDICARE

## 2019-07-22 LAB
GLUCOSE BLDC GLUCOMTR-MCNC: 113 MG/DL (ref 70–99)
GLUCOSE BLDC GLUCOMTR-MCNC: 129 MG/DL (ref 70–99)
GLUCOSE BLDC GLUCOMTR-MCNC: 236 MG/DL (ref 70–99)
GLUCOSE BLDC GLUCOMTR-MCNC: 244 MG/DL (ref 70–99)
GLUCOSE BLDC GLUCOMTR-MCNC: 257 MG/DL (ref 70–99)

## 2019-07-22 PROCEDURE — 25000132 ZZH RX MED GY IP 250 OP 250 PS 637: Mod: GY | Performed by: HOSPITALIST

## 2019-07-22 PROCEDURE — 25000128 H RX IP 250 OP 636: Performed by: NURSE PRACTITIONER

## 2019-07-22 PROCEDURE — 97530 THERAPEUTIC ACTIVITIES: CPT | Mod: GP

## 2019-07-22 PROCEDURE — 12000000 ZZH R&B MED SURG/OB

## 2019-07-22 PROCEDURE — 25000131 ZZH RX MED GY IP 250 OP 636 PS 637: Mod: GY | Performed by: HOSPITALIST

## 2019-07-22 PROCEDURE — 99232 SBSQ HOSP IP/OBS MODERATE 35: CPT | Performed by: NURSE PRACTITIONER

## 2019-07-22 PROCEDURE — 97110 THERAPEUTIC EXERCISES: CPT | Mod: GO

## 2019-07-22 PROCEDURE — 00000146 ZZHCL STATISTIC GLUCOSE BY METER IP

## 2019-07-22 PROCEDURE — 25000132 ZZH RX MED GY IP 250 OP 250 PS 637: Mod: GY | Performed by: NURSE PRACTITIONER

## 2019-07-22 RX ORDER — GLIMEPIRIDE 2 MG/1
4 TABLET ORAL
Status: DISCONTINUED | OUTPATIENT
Start: 2019-07-22 | End: 2019-07-24 | Stop reason: HOSPADM

## 2019-07-22 RX ADMIN — ZINC OXIDE: 200 OINTMENT TOPICAL at 15:05

## 2019-07-22 RX ADMIN — POLYETHYLENE GLYCOL 3350 17 G: 17 POWDER, FOR SOLUTION ORAL at 15:03

## 2019-07-22 RX ADMIN — OXYCODONE HYDROCHLORIDE 2.5 MG: 5 TABLET ORAL at 11:55

## 2019-07-22 RX ADMIN — ACETAMINOPHEN 650 MG: 325 TABLET, FILM COATED ORAL at 10:51

## 2019-07-22 RX ADMIN — HYDROCORTISONE: 25 CREAM TOPICAL at 10:47

## 2019-07-22 RX ADMIN — CEFDINIR 300 MG: 300 CAPSULE ORAL at 10:42

## 2019-07-22 RX ADMIN — ASPIRIN 81 MG: 81 TABLET, COATED ORAL at 10:44

## 2019-07-22 RX ADMIN — ONDANSETRON 4 MG: 4 TABLET, ORALLY DISINTEGRATING ORAL at 08:31

## 2019-07-22 RX ADMIN — GLIMEPIRIDE 4 MG: 2 TABLET ORAL at 15:02

## 2019-07-22 RX ADMIN — INSULIN ASPART 3 UNITS: 100 INJECTION, SOLUTION INTRAVENOUS; SUBCUTANEOUS at 12:04

## 2019-07-22 RX ADMIN — INSULIN ASPART 2 UNITS: 100 INJECTION, SOLUTION INTRAVENOUS; SUBCUTANEOUS at 17:57

## 2019-07-22 RX ADMIN — ZINC OXIDE: 200 OINTMENT TOPICAL at 09:06

## 2019-07-22 RX ADMIN — GABAPENTIN 200 MG: 100 CAPSULE ORAL at 22:13

## 2019-07-22 RX ADMIN — AMLODIPINE BESYLATE 5 MG: 5 TABLET ORAL at 10:44

## 2019-07-22 RX ADMIN — HYDROCORTISONE: 25 CREAM TOPICAL at 15:05

## 2019-07-22 RX ADMIN — ACETAMINOPHEN 650 MG: 325 TABLET, FILM COATED ORAL at 01:55

## 2019-07-22 RX ADMIN — ENOXAPARIN SODIUM 40 MG: 40 INJECTION SUBCUTANEOUS at 19:17

## 2019-07-22 RX ADMIN — Medication 1 CAPSULE: at 10:51

## 2019-07-22 RX ADMIN — GABAPENTIN 200 MG: 100 CAPSULE ORAL at 10:43

## 2019-07-22 RX ADMIN — FAMOTIDINE 20 MG: 20 TABLET, FILM COATED ORAL at 22:13

## 2019-07-22 RX ADMIN — FAMOTIDINE 20 MG: 20 TABLET, FILM COATED ORAL at 10:44

## 2019-07-22 RX ADMIN — OXYCODONE HYDROCHLORIDE 2.5 MG: 5 TABLET ORAL at 17:56

## 2019-07-22 RX ADMIN — HYDROCORTISONE: 25 CREAM TOPICAL at 19:17

## 2019-07-22 RX ADMIN — CEFDINIR 300 MG: 300 CAPSULE ORAL at 19:17

## 2019-07-22 ASSESSMENT — ACTIVITIES OF DAILY LIVING (ADL)
ADLS_ACUITY_SCORE: 22
ADLS_ACUITY_SCORE: 18
ADLS_ACUITY_SCORE: 18
ADLS_ACUITY_SCORE: 19
ADLS_ACUITY_SCORE: 18
ADLS_ACUITY_SCORE: 22

## 2019-07-22 NOTE — PHARMACY
[facility]    Pharmacy      Antimicrobial Stewardship Note     Current antimicrobial therapy:  Anti-infectives (From now, onward)    Start     Dose/Rate Route Frequency Ordered Stop    07/21/19 1015  cefdinir (OMNICEF) capsule 300 mg      300 mg Oral EVERY 12 HOURS SCHEDULED 07/21/19 1013          Indication: UTI    Days of Therapy: 2     Pertinent labs:  Creatinine   Creatinine   Date Value Ref Range Status   07/20/2019 0.63 0.52 - 1.04 mg/dL Final   07/18/2019 0.71 0.52 - 1.04 mg/dL Final     WBC   WBC   Date Value Ref Range Status   07/20/2019 7.9 4.0 - 11.0 10e9/L Final   07/18/2019 8.6 4.0 - 11.0 10e9/L Final     Procalcitonin No results found for: PCAL  CRP No results found for: CRP    Culture Results:  7-Day Micro Results     Procedure Component Value Units Date/Time   Active MRSA Surveillance Culture [[id number]] Collected: 07/19/19 0145   Order Status: Completed Lab Status: Final result Updated: 07/20/19 0641   Specimen: Swab from Nose     Specimen Description Swab    Culture Micro No MRSA isolated   Urine Culture Aerobic Bacterial [[id number]] (Abnormal) Collected: 07/18/19 2249   Order Status: Completed Lab Status: Preliminary result Updated: 07/21/19 1012   Specimen: Catheterized Urine from Urine catheter     Specimen Description Catheterized Urine    Culture Micro >100,000 colonies/mL   Alpha hemolytic Streptococcus   Identification to follow.       Recommendations/Interventions:  1. No recommendations at this time. Will continue to monitor.      Juan Antonio Bills  July 22, 2019

## 2019-07-22 NOTE — PROGRESS NOTES
[facility]    Hospitalist Progress Note    Date of Service (when I saw the patient): 07/22/2019    Assessment & Plan       Possible Syncope: Patient does not remember the fall-however she does have chronic memory problems. She does remember that she was on the toilet and woke up on the floor when staff called to her. She has demonstrated short term memory problems since her arrival, this may be chronic. She has had frequent falls over the last month or so, she relates those falls to her left hip pain and her leg suddenly giving out. She did hit her head in the fall. CT scan negative for acute bleed. MRI does not show any acute changes, possible changed related to parkinson's. She does have a reported history of stroke on her medical record but this is not mentioned. She was stable on telemetry x48 hours without ectopy, discontinued 7/20.  Continue fall precautions.      Chronic left hip pain: Reported acute on chronic pain 7/2 after 2 falls at home within 24 hours. Plain films at that time were negative for fractures. Since that time she states her hip has continued to give out on her causing falls. She has significant pain as well, has had SI joint injections in the past. CT of hip shows greater trochanter fracture. Suspect this was sustained in first 2 falls beginning of July-though x-rays at the time did not show a fracture by report. This then possibly extended and caused future falls with the leg giving out when she attempted to weight bear. No surgical intervention needed as nondisplaced, but will need to be light toe touch/nonweightbearing for 6 weeks. She is unable to stand without bearing weight on the left leg, she will need SNF placement.     Left hip fracture, greater trochanter: as above.       Essential hypertension: In clinic she has had several normal systolic pressures 120-130's, however she was reporting feeling lightheaded and dizzy and so was weaned off her losartan. Since her arrival  to the floor she has been persistently hypertensive in the 170-180's but diastolic only running in the 50-60's. She does have orthostatic hypotension with a change in systolic from 180 to 150. Will place teds to help venous return.        Type 2 diabetes mellitus without complication, without long-term current use of insulin (H): Continue home dose insulin and oral amaryl. She was hypoglycemic AM 7/21, so decreased amaryl from 6mg to 4mg and lantus from 10u to 5units. This may be an issue for her at home as well. Will hold tonight's dose of Lantus to see if we can eliminate permanently.     UTI: Culture on urine from ED shows >100K colony hemolytic strep, will start omnicef, discontinue Pickering catheter to see if she can urinate on her own.      Falls frequently: With fall prior to arrival she did have head injury, small laceration, and complaints of hip pain and urinary retention. Trauma consult completed.        Urinary retention: With overflow incontinence. She is having frequent incontinent episodes, bladder scan showed >500cc urine after void overnight on arrival so she was straight cathed. Pickering placed 7/19. There was a concern with retention that she may have spinal injury from either this fall or her fall early in July. She has known bulging disc and spondylosis of L-spine from MRI and CT scan that were done in May 2019. Repeat CT of lumbar spine shows known degenerative changes but no new fractures or central canal stenosis. Dc'd pickering 7/2, however she continues to retain requiring straight cath overnight for post-void residual of >500mls. Will replace pickering catheter, will need outpatient follow-up with urology.     Right arm weakness:Resolved.  Patient reporting new right arm weakness and tremor. MRI this morning negative for acute changes. On exam she is not having unilateral weakness. 7/20 feels weakness and tremor have improved-perhaps soft tissue injury from fall.     Rash: Very large pruritic rash on  back and buttocks-pt thinks due to briefs she has been wearing for the last several weeks-she does have known latex allergy. She also has some labial irritation and excoriation and general erythema-likely combination of continual overflow incontinence, contact dermatitis from briefs and the UTI causing irritation.  She does have a scar on the right labia with some redundant tissue with a small cavity that is not tunnwling and did not have any pustulant material when probed with cotton swab. No briefs, will discontinue pickering today,continue with julian cares desitin to labia/peria area and hydrocortisone cream to back rash.    DVT Prophylaxis: Pneumatic Compression Devices  Code Status: DNR    Disposition: Expected discharge when placement available.     Maryanne Hicks, CNP    Interval History   Slept a bit, denies headache or head pain. No pain this morning, tolerating oxycodone.     -Data reviewed today: I reviewed all new labs and imaging results over the last 24 hours.     Physical Exam   Temp: 98.2  F (36.8  C) Temp src: Tympanic BP: (!) 141/47 Pulse: 58 Heart Rate: 61 Resp: 16 SpO2: 93 % O2 Device: None (Room air)    Vitals:    07/20/19 0422 07/21/19 0554 07/22/19 0500   Weight: 60.1 kg (132 lb 7.9 oz) 61.4 kg (135 lb 5.8 oz) 60 kg (132 lb 4.4 oz)     Vital Signs with Ranges  Temp:  [97.8  F (36.6  C)-99.3  F (37.4  C)] 98.2  F (36.8  C)  Pulse:  [58] 58  Heart Rate:  [59-64] 61  Resp:  [16] 16  BP: (141-185)/(47-63) 141/47  SpO2:  [92 %-93 %] 93 %  I/O last 3 completed shifts:  In: 700 [P.O.:700]  Out: 1025 [Urine:1025]    Urethral Catheter Non-latex;Straight-tip (Active)   Urine Output 200 mL 7/22/2019 10:00 AM   Number of days: 0       Wound 07/18/19 Other (Comment) Labia Other (comment) right labia pea sized open area (Active)   Site Assessment Pink 7/22/2019  1:30 AM   Julian-wound Assessment WDL 7/22/2019  1:30 AM   Drainage Amount None 7/22/2019  1:30 AM   Wound Care/Cleansing Soap and water 7/22/2019   1:30 AM   Dressing Open to air 7/22/2019  1:30 AM   Number of days: 4     Line/device assessment(s) completed for medical necessity    Constitutional: Awake,alert, no acute distress though flat affect and depressed mood  Respiratory: Clear but diminished throughout without any wheezes, crackles or rhonchi.  Cardiovascular: HRR, no murmurs, rubs, thrills. No peripheral edema.   GI: Soft,nontender, bowel sounds positive.   Skin/Integumen: Large bruise to posterior occiput, small laceration on the top of the bruise no longer bleeding. Raised, dry, rough rash to lower back and buttocks that is pruritic but significantly improved. She also has some labial irritation and excoriation and general erythema-likely combination of continual overflow incontinence, contact dermatitis from briefs and the UTI causing irritation.  She does have a scar on the right labia with some redundant tissue with a small cavity that is not tunneling and did not have any pustulant material when probed with cotton swab.  Neuro: Equal hand grasps, equal strength at shoulder, hip and foot.  No facial droop or slurred speech. Pupils equal and reactive.        Medications       amLODIPine  5 mg Oral Daily     aspirin  81 mg Oral Daily     cefdinir  300 mg Oral Q12H TEMI     enoxaparin  40 mg Subcutaneous Q24H     famotidine  20 mg Oral BID     gabapentin  200 mg Oral BID     hydrocortisone   Topical TID     insulin aspart  1-7 Units Subcutaneous TID AC     insulin aspart  1-5 Units Subcutaneous At Bedtime     insulin glargine  10 Units Subcutaneous At Bedtime     lactobacillus rhamnosus (GG)  1 capsule Oral Daily     sodium chloride (PF)  3 mL Intracatheter Q8H       Data   Recent Labs   Lab 07/20/19  0606 07/18/19  1825 07/18/19  1812   WBC 7.9  --  8.6   HGB 10.8*  --  11.6*   MCV 95  --  93     --  398   INR  --  1.02  --      --  140   POTASSIUM 4.4  --  4.8   CHLORIDE 113*  --  113*   CO2 21  --  19*   BUN 15  --  20   CR 0.63  --   0.71   ANIONGAP 6  --  8   DIOMEDES 8.8  --  9.4   *  --  169*   ALBUMIN  --   --  3.4   PROTTOTAL  --   --  7.1   BILITOTAL  --   --  0.1*   ALKPHOS  --   --  144   ALT  --   --  20   AST  --   --  16   TROPI  --   --  <0.015       No results found for this or any previous visit (from the past 24 hour(s)).

## 2019-07-22 NOTE — PLAN OF CARE
"Temp: 97.8  F (36.6  C) Temp src: Temporal BP: (!) 185/56   Heart Rate: 62 Resp: 16 SpO2: 93 % O2 Device: None (Room air)      Alert, forgetful.  Asking God several times this shift to \"take her home to Heaven\" and making statements that she is upset with her \"broken body.\" Pt was easily consoled and redirected after comments.  C/o L hip pain and heartburn this shift, received brennan 2.5mg 2x this shift for pain, pt states that she feels better after pain med and is able to rest and received pepcid once for heartburn.  Up to commode and chair with tomasa lift, unable to void on commode, but did have small, loose BM.  Bladder scanned at 1543-showed 119 ml, 1914-showed 247ml, 2046-showed 380 ml-straight cath'd with 425ml out.  At 2245 pt was a little incontinent and did void 100ml on the bedpan, post void bladder scan was 212ml.  BG's of 165 and 155 this evening Dr. Colmenares updated on nighttime BG of 155, ordered 5u of insulin glargine, instead of pt's usual 10u for this evening.  Pt is eating and drinking, but for dinner she had baked cod, broccoli and jello.  Did not want any snacks before bed. ABX omnicef po. Uses call lt appropriately.    Face to face report given with opportunity to observe patient.    Report given to Jane Leroy   7/21/2019  11:20 PM      "

## 2019-07-22 NOTE — PLAN OF CARE
Face to face report given with opportunity to observe patient.    Report given to JOE Francis   7/22/2019  3:44 PM

## 2019-07-22 NOTE — PLAN OF CARE
Discharge Planner PT   Patient plan for discharge: SNF  Current status: Pt supine in bed at start of treatment. Pt expressed frustration regarding the fact that she can't walk or WB through LLE. Explained to pt that her hip needs to heel. Placed sling under pt , pt able to roll independently using bed rail. Pt transferred to recliner with tomasa lift. Once in recliner pt was uncomfortable. Unable to get pt positioned comfortably in chair. Got a different chair for pt and used tomasa to transfer. Once in new chair pt was more comfortable. In chair completed gute sets, LAQ, hip flexion ankle pumps, hip abduction/adduction x20 each or as tolerated   Barriers to return to prior living situation: Weakness, NWB LLE, fall risk, tomasa lift  Recommendations for discharge: SNF  Rationale for recommendations: Mobility limited by NWB status LLE. Unable to maintain NWB without use of tomasa       Entered by: Marj Madrigal 07/22/2019 1:34 PM

## 2019-07-22 NOTE — PLAN OF CARE
Discharge Planner OT   Patient plan for discharge: SNF  Current status: Pt participated in BUE AROM exercises while supine in bed, including 2 hand clasp for shoulder flexion (due to difficulties with actively raising her R shoulder), elbow flexion/extension, forearm pronation/supination, wrist flex/extension, and finger flex/extension for 2 sets of 8-10 reps each.  Barriers to return to prior living situation: Fall risk, use of mechanical lift for mobility, weakness, impaired activity tolerance  Recommendations for discharge: SNF   Rationale for recommendations: Pt is exhibiting increased deficits in ADLs/functional mobility due to a L hip fx and NWB status x 6 weeks. Pt requires a mechanical lift for transfers and would not be safe to return to [facility].       Entered by: Leeann Arriaga 07/22/2019 10:57 AM

## 2019-07-22 NOTE — PLAN OF CARE
Reason for hospital stay:  Syncope, Hip Fracture, UTI    Most recent vitals: /63   Pulse 58   Temp 97.8  F (36.6  C) (Tympanic)   Resp 16   Wt 61.4 kg (135 lb 5.8 oz)   SpO2 92%   Pain Management:  C/o severe left hip pain, administered Tylenol, sleeping on recheck  Orientation:  A&O. Pleasant, slept most of night  Cardiac:  HR reg, payam 59  Respiratory:  LS clear. Ty sob  GI:  BS active. Denies nausea  :  Voided with difficulty. Voided 100 mL with post bladder scan of 483, tried to void again with 200 mL out and post bladder scan of 282. Did not need to straight cath this shift  Diet: CHO.   Skin Issues:  Scattered bruising, laceration to back of the head, wound to the right labia, rash to lower back.  IVF:  No IV MD aware following Pt removing onlast shift  ABX:  Omnicef po  Mobility:  A2 to transfer with tomasa  Safety:  Call light within reach. Bed alarm active    Comments: Ace wraps ordered this am to legs    7/22/2019  5:34 AM  Jane Christopher    Face to face report given with opportunity to observe patient.    Report given to Laura, JOE Christopher   7/22/2019  7:36 AM

## 2019-07-22 NOTE — PLAN OF CARE
Pt c/o of nausea and dizziness this morning after getting up to the commode. One small emesis. Medicated with Zofran ODT- effective and no further c/o N/V. Unable to stand with TTWB so continues to be a tomasa lift. Up to chair, with c/o of pain to left hip and generalized body aches- medicated with tylenol and roxicodone with relief. 14F silicone catheter placed per MD orders for urinary retention, patent and draining into standard bag. Zinc oxide cream applied to excoriation on labia- pink in color with small amount of of bleeding. Rash to lower back improving, hydrocortisone cream applied. Blood pressure elevated this am and provider updated, scheduled Norvasc given. Afebrile. Alert and oriented with forgetfulness. Pleasant and cooperative with cares. Turned and repositioned every two hours, free from any falls or injury. Call light within reach, will continue to monitor.

## 2019-07-23 ENCOUNTER — APPOINTMENT (OUTPATIENT)
Dept: OCCUPATIONAL THERAPY | Facility: HOSPITAL | Age: 84
DRG: 689 | End: 2019-07-23
Payer: MEDICARE

## 2019-07-23 ENCOUNTER — APPOINTMENT (OUTPATIENT)
Dept: PHYSICAL THERAPY | Facility: HOSPITAL | Age: 84
DRG: 689 | End: 2019-07-23
Payer: MEDICARE

## 2019-07-23 LAB
ANION GAP SERPL CALCULATED.3IONS-SCNC: 4 MMOL/L (ref 3–14)
BUN SERPL-MCNC: 13 MG/DL (ref 7–30)
CALCIUM SERPL-MCNC: 9.2 MG/DL (ref 8.5–10.1)
CHLORIDE SERPL-SCNC: 104 MMOL/L (ref 94–109)
CO2 SERPL-SCNC: 28 MMOL/L (ref 20–32)
CREAT SERPL-MCNC: 0.59 MG/DL (ref 0.52–1.04)
GFR SERPL CREATININE-BSD FRML MDRD: 81 ML/MIN/{1.73_M2}
GLUCOSE BLDC GLUCOMTR-MCNC: 139 MG/DL (ref 70–99)
GLUCOSE BLDC GLUCOMTR-MCNC: 195 MG/DL (ref 70–99)
GLUCOSE BLDC GLUCOMTR-MCNC: 200 MG/DL (ref 70–99)
GLUCOSE BLDC GLUCOMTR-MCNC: 250 MG/DL (ref 70–99)
GLUCOSE BLDC GLUCOMTR-MCNC: 306 MG/DL (ref 70–99)
GLUCOSE SERPL-MCNC: 189 MG/DL (ref 70–99)
POTASSIUM SERPL-SCNC: 4.3 MMOL/L (ref 3.4–5.3)
SODIUM SERPL-SCNC: 136 MMOL/L (ref 133–144)

## 2019-07-23 PROCEDURE — 25000132 ZZH RX MED GY IP 250 OP 250 PS 637: Mod: GY | Performed by: HOSPITALIST

## 2019-07-23 PROCEDURE — 80048 BASIC METABOLIC PNL TOTAL CA: CPT | Performed by: INTERNAL MEDICINE

## 2019-07-23 PROCEDURE — 25000132 ZZH RX MED GY IP 250 OP 250 PS 637: Mod: GY | Performed by: INTERNAL MEDICINE

## 2019-07-23 PROCEDURE — 25000131 ZZH RX MED GY IP 250 OP 636 PS 637: Mod: GY | Performed by: HOSPITALIST

## 2019-07-23 PROCEDURE — 12000000 ZZH R&B MED SURG/OB

## 2019-07-23 PROCEDURE — 36415 COLL VENOUS BLD VENIPUNCTURE: CPT | Performed by: INTERNAL MEDICINE

## 2019-07-23 PROCEDURE — 25000128 H RX IP 250 OP 636: Performed by: NURSE PRACTITIONER

## 2019-07-23 PROCEDURE — 25000132 ZZH RX MED GY IP 250 OP 250 PS 637: Mod: GY | Performed by: NURSE PRACTITIONER

## 2019-07-23 PROCEDURE — 97110 THERAPEUTIC EXERCISES: CPT | Mod: GO

## 2019-07-23 PROCEDURE — 97530 THERAPEUTIC ACTIVITIES: CPT | Mod: GP | Performed by: PHYSICAL THERAPIST

## 2019-07-23 PROCEDURE — 99239 HOSP IP/OBS DSCHRG MGMT >30: CPT | Performed by: INTERNAL MEDICINE

## 2019-07-23 PROCEDURE — 00000146 ZZHCL STATISTIC GLUCOSE BY METER IP

## 2019-07-23 RX ORDER — GLIMEPIRIDE 4 MG/1
4 TABLET ORAL
DISCHARGE
Start: 2019-07-23 | End: 2019-11-05

## 2019-07-23 RX ORDER — IBUPROFEN 400 MG/1
400 TABLET, FILM COATED ORAL EVERY 6 HOURS PRN
Qty: 25 TABLET | DISCHARGE
Start: 2019-07-23

## 2019-07-23 RX ORDER — PROCHLORPERAZINE 25 MG
12.5 SUPPOSITORY, RECTAL RECTAL EVERY 12 HOURS PRN
Status: DISCONTINUED | OUTPATIENT
Start: 2019-07-23 | End: 2019-07-24 | Stop reason: HOSPADM

## 2019-07-23 RX ORDER — ZINC OXIDE 20 %
OINTMENT (GRAM) TOPICAL
DISCHARGE
Start: 2019-07-23

## 2019-07-23 RX ORDER — POLYETHYLENE GLYCOL 3350 17 G/17G
17 POWDER, FOR SOLUTION ORAL DAILY PRN
DISCHARGE
Start: 2019-07-23 | End: 2019-11-05

## 2019-07-23 RX ORDER — DOCUSATE SODIUM 100 MG/1
100 CAPSULE, LIQUID FILLED ORAL 2 TIMES DAILY
DISCHARGE
Start: 2019-07-23 | End: 2019-11-05

## 2019-07-23 RX ORDER — CALCIUM CARBONATE 500 MG/1
1 TABLET, CHEWABLE ORAL 3 TIMES DAILY PRN
DISCHARGE
Start: 2019-07-23 | End: 2019-11-05

## 2019-07-23 RX ORDER — AMOXICILLIN 500 MG/1
500 CAPSULE ORAL EVERY 8 HOURS SCHEDULED
Status: DISCONTINUED | OUTPATIENT
Start: 2019-07-23 | End: 2019-07-24 | Stop reason: HOSPADM

## 2019-07-23 RX ORDER — AMLODIPINE BESYLATE 5 MG/1
5 TABLET ORAL DAILY
DISCHARGE
Start: 2019-07-23

## 2019-07-23 RX ORDER — OXYCODONE HYDROCHLORIDE 5 MG/1
2.5 TABLET ORAL EVERY 6 HOURS PRN
Qty: 10 TABLET | Refills: 0 | Status: SHIPPED | OUTPATIENT
Start: 2019-07-23 | End: 2019-07-24

## 2019-07-23 RX ORDER — IBUPROFEN 400 MG/1
400 TABLET, FILM COATED ORAL EVERY 6 HOURS PRN
Status: DISCONTINUED | OUTPATIENT
Start: 2019-07-23 | End: 2019-07-24 | Stop reason: HOSPADM

## 2019-07-23 RX ORDER — ACETAMINOPHEN 325 MG/1
650 TABLET ORAL EVERY 4 HOURS PRN
DISCHARGE
Start: 2019-07-23 | End: 2019-07-24

## 2019-07-23 RX ORDER — INSULIN GLARGINE 100 [IU]/ML
6 INJECTION, SOLUTION SUBCUTANEOUS AT BEDTIME
DISCHARGE
Start: 2019-07-23 | End: 2019-11-05 | Stop reason: DRUGHIGH

## 2019-07-23 RX ORDER — AMOXICILLIN 500 MG/1
500 CAPSULE ORAL EVERY 8 HOURS
Qty: 21 CAPSULE | DISCHARGE
Start: 2019-07-23 | End: 2019-07-24

## 2019-07-23 RX ORDER — HYDROCORTISONE 2.5 %
CREAM (GRAM) TOPICAL 3 TIMES DAILY
Qty: 3.5 G | DISCHARGE
Start: 2019-07-23 | End: 2019-07-24

## 2019-07-23 RX ORDER — ONDANSETRON 4 MG/1
4 TABLET, ORALLY DISINTEGRATING ORAL EVERY 6 HOURS PRN
DISCHARGE
Start: 2019-07-23

## 2019-07-23 RX ORDER — FAMOTIDINE 20 MG/1
20 TABLET, FILM COATED ORAL 2 TIMES DAILY
DISCHARGE
Start: 2019-07-23 | End: 2019-07-24

## 2019-07-23 RX ADMIN — OXYCODONE HYDROCHLORIDE 2.5 MG: 5 TABLET ORAL at 00:02

## 2019-07-23 RX ADMIN — PROCHLORPERAZINE 12.5 MG: 25 SUPPOSITORY RECTAL at 09:00

## 2019-07-23 RX ADMIN — HYDROCORTISONE: 25 CREAM TOPICAL at 21:39

## 2019-07-23 RX ADMIN — AMLODIPINE BESYLATE 5 MG: 5 TABLET ORAL at 10:26

## 2019-07-23 RX ADMIN — ENOXAPARIN SODIUM 40 MG: 40 INJECTION SUBCUTANEOUS at 21:38

## 2019-07-23 RX ADMIN — MAGNESIUM HYDROXIDE 30 ML: 400 SUSPENSION ORAL at 14:00

## 2019-07-23 RX ADMIN — GABAPENTIN 200 MG: 100 CAPSULE ORAL at 10:32

## 2019-07-23 RX ADMIN — GLIMEPIRIDE 4 MG: 2 TABLET ORAL at 13:08

## 2019-07-23 RX ADMIN — ASPIRIN 81 MG: 81 TABLET, COATED ORAL at 13:00

## 2019-07-23 RX ADMIN — Medication 1 CAPSULE: at 13:01

## 2019-07-23 RX ADMIN — HYDROCORTISONE: 25 CREAM TOPICAL at 14:04

## 2019-07-23 RX ADMIN — GABAPENTIN 200 MG: 100 CAPSULE ORAL at 21:38

## 2019-07-23 RX ADMIN — ACETAMINOPHEN 650 MG: 325 TABLET, FILM COATED ORAL at 12:58

## 2019-07-23 RX ADMIN — AMOXICILLIN 500 MG: 500 CAPSULE ORAL at 21:38

## 2019-07-23 RX ADMIN — FAMOTIDINE 20 MG: 20 TABLET, FILM COATED ORAL at 21:38

## 2019-07-23 RX ADMIN — ACETAMINOPHEN 650 MG: 325 TABLET, FILM COATED ORAL at 03:55

## 2019-07-23 RX ADMIN — INSULIN ASPART 2 UNITS: 100 INJECTION, SOLUTION INTRAVENOUS; SUBCUTANEOUS at 13:07

## 2019-07-23 RX ADMIN — FAMOTIDINE 20 MG: 20 TABLET, FILM COATED ORAL at 10:24

## 2019-07-23 RX ADMIN — ONDANSETRON 4 MG: 4 TABLET, ORALLY DISINTEGRATING ORAL at 07:55

## 2019-07-23 RX ADMIN — AMOXICILLIN 500 MG: 500 CAPSULE ORAL at 14:00

## 2019-07-23 RX ADMIN — INSULIN ASPART 3 UNITS: 100 INJECTION, SOLUTION INTRAVENOUS; SUBCUTANEOUS at 17:24

## 2019-07-23 RX ADMIN — IBUPROFEN 400 MG: 400 TABLET ORAL at 16:18

## 2019-07-23 ASSESSMENT — ACTIVITIES OF DAILY LIVING (ADL)
ADLS_ACUITY_SCORE: 20
ADLS_ACUITY_SCORE: 22
ADLS_ACUITY_SCORE: 20
ADLS_ACUITY_SCORE: 20
ADLS_ACUITY_SCORE: 21
ADLS_ACUITY_SCORE: 21

## 2019-07-23 NOTE — PLAN OF CARE
Daughter-in-law Lucie called and was given an update via telephone at this time. Daughter-in-law states that oxycodone always makes patient nauseous.

## 2019-07-23 NOTE — PROGRESS NOTES
[facility]    Medicine Progress Note - Hospitalist Service       Date of Admission:  7/18/2019  Assessment & Plan      89 lady admitted with fall / syncope. Likely vasovagal. Also has postural hypotension. And chronic Left hip pain, has left greater trochanter fracture, advised conservative mg, NWB for 6 weeks.  UTI with Alpha hemolytic strep.    She was to be discharged to SNF today but I will hold it off for now due to two episodes of vomiting just now.  S/p zofran ODT.  Will give compazine suppository.  Will try to minimize roxicodone as it may be causing her nausea and give scheduled tylenol and add prn advil.  Will check labs today.  Stop omincef.  Start amoxicillin to cover possible enterococcus. Spoke with lab. Awaiting sensitivity.    Discussed with SW and RN    Diet: Consistent Carbohydrate Diet 4697-8525 Calories: Moderate Consistent CHO (4-6 CHO units/meal)    DVT Prophylaxis: Heparin SQ  Carrera Catheter: in place, indication: Retention, Retention  Code Status: DNR      Disposition Plan   Expected discharge: Tomorrow, recommended to transitional care unit once nausea / vomiting resolved, adquate pain control.  Entered: Joellen Roberts MD 07/23/2019, 8:47 AM       The patient's care was discussed with the Attending Physician, Dr. Roberts.    Joellen Roberts MD  Hospitalist Service  [facility]    ______________________________________________________________________    Interval History   C/o left hip pain  Threw up twice this morning, no abdo pain  No diarrhea  Last BM 2 days ago    Data reviewed today: I reviewed all medications, new labs and imaging results over the last 24 hours. I personally reviewed no images or EKG's today.    Physical Exam   Vital Signs: Temp: 97.8  F (36.6  C) Temp src: Tympanic BP: 179/52   Heart Rate: 67 Resp: 18 SpO2: 93 % O2 Device: None (Room air)    Weight: 132 lbs 11.47 oz  Alert , oriented * 3  Chest CTA b/l  S1S2 RRR  abdmen soft and NT, BS +  No b/l LE  edema, good ROM both hips with pain on int and ext rotation of left hip    Data   Recent Labs   Lab 07/20/19  0606 07/18/19  1825 07/18/19  1812   WBC 7.9  --  8.6   HGB 10.8*  --  11.6*   MCV 95  --  93     --  398   INR  --  1.02  --      --  140   POTASSIUM 4.4  --  4.8   CHLORIDE 113*  --  113*   CO2 21  --  19*   BUN 15  --  20   CR 0.63  --  0.71   ANIONGAP 6  --  8   DIOMEDES 8.8  --  9.4   *  --  169*   ALBUMIN  --   --  3.4   PROTTOTAL  --   --  7.1   BILITOTAL  --   --  0.1*   ALKPHOS  --   --  144   ALT  --   --  20   AST  --   --  16   TROPI  --   --  <0.015

## 2019-07-23 NOTE — PLAN OF CARE
Pt A&O but forgetful. Pt needs reminders to not place weight on left leg. Pt reports pain in left leg, received tylenol and oxycodone, ice also applied and pt repositioned for comfort. Pt found to be lifting entire body weight off of bed using only left leg at times. Pt re-educated about the fracture. Afebrile. HR 60s. /58 and 174/61. RR 16 and sating 94% on room air. Lung sounds are diminished. Bowel sounds are active. Pulses palpable. Carrera patent with adequate output. Some redness in groin and dry flaky rash with redness to lower back. Accu check 195. Alarms on, call light within reach and pt calls appropriately.     Face to face report given with opportunity to observe patient.    Report given to JOE Cruz   7/23/2019  7:39 AM

## 2019-07-23 NOTE — PLAN OF CARE
Discharge Planner PT   Patient plan for discharge: short term rehab  Current status: tomasa lift, able to roll independently in bed  Barriers to return to prior living situation: tomasa lift transfers, NWB L LE x6 weeks  Recommendations for discharge: short term rehab  Rationale for recommendations: Pt unable to return to assisted living due to NWB x6 weeks and unable to maintain.  Pt would benefit from short term rehab to improve function and mobility         Entered by: Sarita Garcia, PT 07/23/2019 3:05 PM

## 2019-07-23 NOTE — DISCHARGE SUMMARY
[facility]    Discharge Summary  Hospitalist    Date of Admission:  7/18/2019  Date of Discharge:  7/24/2019  Discharging Provider: Joellen Roberts  Date of Service (when I saw the patient): 7/24/19  Discharge Diagnoses     Fall /  syncope  Acute on Chronic left hip pain  Greater trochanter fracture. Left. Nondisplaced.  UTI   Urine retention / has pickering catheter  Short term memory impairment  GERD  DM II  HTN      History of Present Illness     Nicole Johnson is a 89 year old female presented from assisted living for evaluation of syncopal episode. Patient was at home went to bathroom; got up then had syncopal episode. She was found on the floor by staff and EMS was called. She had hit her head. She denies headache, chest pain, sob. She complains of right arm weakness, no slurred speech; facial droop. No bowel or bladder incontinence. In the ED she was found to be orthostatic and dizzy and thus admitted under observation status. Per family she has hard time getting up from recliner where she has spent most of the last week sitting in chair. Only gets up to go to bathroom and eat. Patient has chronic hip pain.      Hospital Course   Nicole Johnson was admitted on 7/18/2019.  The following problems were addressed during her hospitalization:    syncope- h/o frequent falls over last month. Likely vasovagal syncope.  MRI brain does not show any acute changes. No events on tele monitor.     Left hip pain:  Has chronic left hip pain. CT hip showed nondisplaced left trochanter fracture possibly sustained during one of her recent falls. advised NWB on left leg but she is not able to manage and needs tomasa lift. She is to stay NWB on LLE for 6 weeks.     HTN:  Systolic BP running 170-180 with ongoing pain. She was recently taken off losartan prior to this admission. She does have orthostatic hypotension. She has been started on norvasc on this admission and further f/u as outpt will be done by PCP.    UTI:  UCx with  peptostreptococcus. Will give give 2 more days of abx on discharge to complete 7 day therapy.    Urine retention.   Being discharged with pickering as she retained > 500cc after pickering d/c'd yesterday.  CT lumbar spine shows degenerative changes only.    Rash  Very large pruritic rash on back and buttocks-pt thinks due to briefs she has been wearing for the last several weeks-she does have known latex allergy. She also has some labial irritation and excoriation and general erythema-likely combination of continual overflow incontinence, contact dermatitis from briefs and the UTI causing irritation.  rash improved dramatically with topical zinc and hydrocortisone.    DM:  Her FS were initially lower and have now started on creep up in 200-300 range. She is now on lower than her home dose of amaryl and lower dose of lantus resumed on discharged. Her FS will continue to be checked at the SNF and further changes made accordingly.      Joellen Roberts    Significant Results and Procedures   F/u UCx results    Pending Results   These results will be followed up by   Unresulted Labs Ordered in the Past 30 Days of this Admission     Date and Time Order Name Status Description    7/18/2019 1812 Estimated Average Glucose In process           Code Status   DNR       Primary Care Physician   Ayaan Barnhart    Physical Exam   Temp: 97.3  F (36.3  C) Temp src: Tympanic BP: 179/58   Heart Rate: 59 Resp: 16 SpO2: 93 % O2 Device: None (Room air)    Vitals:    07/22/19 0500 07/23/19 0500 07/24/19 0601   Weight: 60 kg (132 lb 4.4 oz) 60.2 kg (132 lb 11.5 oz) 59.3 kg (130 lb 11.7 oz)     Vital Signs with Ranges  Temp:  [97.3  F (36.3  C)-98.1  F (36.7  C)] 97.3  F (36.3  C)  Heart Rate:  [51-60] 59  Resp:  [16] 16  BP: (149-179)/(43-62) 179/58  SpO2:  [89 %-93 %] 93 %  I/O last 3 completed shifts:  In: 740 [P.O.:740]  Out: 975 [Urine:925; Emesis/NG output:50]    Constitutional: pleasant, oriented * 3  Eyes: EVE  Respiratory: CTA  b/l  Cardiovascular: S1 S2 RRR  GI: abdomen is soft and nontender  Genitourinary: rash in perineal / back area  Skin: as above  Musculoskeletal: good ROM b/l UE and LE. Has mild inguinal pain on internal and external rotation of left hip  Neurologic: no focal deficits  Neuropsychiatric: appropriate mood and affect    Discharge Disposition   Discharged to nursing home  Condition at discharge: Stable    Consultations This Hospital Stay   SOCIAL WORK IP CONSULT  PHYSICAL THERAPY ADULT IP CONSULT  OCCUPATIONAL THERAPY ADULT IP CONSULT  ORTHOPEDIC SURGERY IP CONSULT  PHYSICAL THERAPY ADULT IP CONSULT    Time Spent on this Encounter   Joellen JIMÉNEZ, personally saw the patient today and spent greater than 30 minutes discharging this patient.    Discharge Orders      SURGICAL SUPPLIES    compresson stockings. B/l LE. Knee high. Mild to moderate compression.     General info for SNF    Length of Stay Estimate: Short Term Care: Estimated # of Days >30  Condition at Discharge: Stable  Level of care:skilled   Rehabilitation Potential: Fair  Admission H&P remains valid and up-to-date: Yes  Recent Chemotherapy: N/A  Use Nursing Home Standing Orders: Yes     Mantoux instructions    Give two-step Mantoux (PPD) Per Facility Policy Yes     Glucose monitor nursing POCT    Before meals and at bedtime     Carrera catheter    To straight gravity drainage. Change catheter every 2 weeks and PRN for leaking or decreased uring output with signs of bladder distention. DO NOT change catheter without a specific MD order IF diagnosis of benign prostatic hypertrophy (BPH), neurogenic bladder, or other urological conditions     Activity - Up with assistive device    Use tomasa lift.     Weight bearing status    NWB on LLE.     DNR (Do Not Resuscitate)     Physical Therapy Adult Consult    Evaluate and treat as clinically indicated.    Reason:  Left greater trochanteric fracture     Discharge Medications   Current Discharge Medication List      START  taking these medications    Details   acetaminophen (TYLENOL) 325 MG tablet Take 2 tablets (650 mg) by mouth every 6 hours for 7 days    Comments: After 7 days transition to prn dosing.  I.e., tylenol 650 mg PO every 6 hours prn  Associated Diagnoses: Closed fracture of trochanter of left femur, initial encounter (H); Chronic left hip pain      amLODIPine (NORVASC) 5 MG tablet Take 1 tablet (5 mg) by mouth daily    Associated Diagnoses: Essential hypertension      amoxicillin (AMOXIL) 500 MG capsule Take 1 capsule (500 mg) by mouth every 8 hours  Qty: 5 capsule    Associated Diagnoses: Acute cystitis without hematuria      calcium carbonate (TUMS) 500 MG chewable tablet Take 1 tablet (500 mg) by mouth 3 times daily as needed for heartburn    Associated Diagnoses: Gastroesophageal reflux disease without esophagitis      famotidine (PEPCID) 20 MG tablet Take 1 tablet (20 mg) by mouth 2 times daily  Qty: 14 tablet    Associated Diagnoses: Gastroesophageal reflux disease without esophagitis      ibuprofen (ADVIL/MOTRIN) 400 MG tablet Take 1 tablet (400 mg) by mouth every 6 hours as needed for moderate pain  Qty: 25 tablet    Associated Diagnoses: Closed fracture of trochanter of left femur, initial encounter (H)      !! insulin aspart (NOVOLOG PEN) 100 UNIT/ML pen Inject 1-7 Units Subcutaneous 3 times daily (before meals)    Associated Diagnoses: Type 2 diabetes mellitus without complication, without long-term current use of insulin (H)      !! insulin aspart (NOVOLOG PEN) 100 UNIT/ML pen Inject 1-5 Units Subcutaneous At Bedtime    Associated Diagnoses: Type 2 diabetes mellitus without complication, without long-term current use of insulin (H)      ondansetron (ZOFRAN-ODT) 4 MG ODT tab Take 1 tablet (4 mg) by mouth every 6 hours as needed for nausea or vomiting    Associated Diagnoses: Nausea      oxyCODONE (ROXICODONE) 5 MG tablet Take 0.5 tablets (2.5 mg) by mouth every 6 hours as needed for breakthrough pain or  severe pain (other. pain control or improvement in physical function. Hold dose for analgesic side effects.)  Qty: 7 tablet, Refills: 0    Associated Diagnoses: Closed fracture of trochanter of left femur, initial encounter (H)      polyethylene glycol (MIRALAX/GLYCOLAX) packet Take 17 g by mouth daily as needed for constipation    Associated Diagnoses: Constipation, unspecified constipation type      zinc oxide (DESITIN) 20 % external ointment Apply topically every hour as needed for irritation    Associated Diagnoses: Wound of sacral region, initial encounter       !! - Potential duplicate medications found. Please discuss with provider.      CONTINUE these medications which have CHANGED    Details   docusate sodium (COLACE) 100 MG capsule Take 1 capsule (100 mg) by mouth 2 times daily    Associated Diagnoses: Constipation, unspecified constipation type      glimepiride (AMARYL) 4 MG tablet Take 1 tablet (4 mg) by mouth daily (with breakfast)    Associated Diagnoses: Type 2 diabetes mellitus without complication, without long-term current use of insulin (H)      insulin glargine (BASAGLAR KWIKPEN) 100 UNIT/ML pen Inject 6 Units Subcutaneous At Bedtime    Associated Diagnoses: Type 2 diabetes mellitus without complication, without long-term current use of insulin (H)         CONTINUE these medications which have NOT CHANGED    Details   aspirin 81 MG EC tablet Take 81 mg by mouth daily      gabapentin (NEURONTIN) 100 MG capsule Take 200 mg by mouth 2 times daily       Lactobacillus Acid-Pectin (LACTOBACILLUS ACIDOPHILUS) TABS Take 1 tablet by mouth daily          STOP taking these medications       Acetaminophen (ARTHRITIS PAIN RELIEF PO) Comments:   Reason for Stopping:             Allergies   Allergies   Allergen Reactions     Carvedilol      Fentanyl      Gabapentin      Glucophage [Metformin]      Januvia [Sitagliptin]      Latex      Plavix [Clopidogrel]      Data   Most Recent 3 CBC's:  Recent Labs   Lab Test  07/20/19  0606 07/18/19  1812   WBC 7.9 8.6   HGB 10.8* 11.6*   MCV 95 93    398      Most Recent 3 BMP's:  Recent Labs   Lab Test 07/23/19  0924 07/20/19  0606 07/18/19  1812    140 140   POTASSIUM 4.3 4.4 4.8   CHLORIDE 104 113* 113*   CO2 28 21 19*   BUN 13 15 20   CR 0.59 0.63 0.71   ANIONGAP 4 6 8   DIOMEDES 9.2 8.8 9.4   * 135* 169*     Most Recent 2 LFT's:  Recent Labs   Lab Test 07/18/19 1812   AST 16   ALT 20   ALKPHOS 144   BILITOTAL 0.1*     Most Recent INR's and Anticoagulation Dosing History:  Anticoagulation Dose History     Recent Dosing and Labs Latest Ref Rng & Units 7/18/2019    INR 0.80 - 1.20 1.02        Most Recent 3 Troponin's:  Recent Labs   Lab Test 07/18/19 1812   TROPI <0.015     Most Recent Cholesterol Panel:No lab results found.  Most Recent 6 Bacteria Isolates From Any Culture (See EPIC Reports for Culture Details):  Recent Labs   Lab Test 07/19/19  0145 07/18/19  2249   CULT No MRSA isolated >100,000 colonies/mL  Peptostreptococcus anaerobius  Susceptibility testing not routinely done  *  <10,000 colonies/mL  Lactobacillus species  No further identification or sensitivity done  *     Most Recent TSH, T4 and A1c Labs:  Recent Labs   Lab Test 07/18/19 1812   A1C 6.9*         60 minutes spent on patient discharge

## 2019-07-23 NOTE — PLAN OF CARE
Patient back into bed at this time. Compazine Suppository given as ordered (see MAR). Will continue to monitor.

## 2019-07-23 NOTE — PLAN OF CARE
Unit Assistant notified this writer that patient is vomiting. Patient had an episode of nausea and vomiting. Small amount of clear, frothy emesis upon entering the room. Patient does not have IV access. Zofran 4 mg given PO at 0755. No further emesis at this time. Will hold off on giving AM pills until patient eats something to prevent further nausea.

## 2019-07-23 NOTE — PLAN OF CARE
Shift assessment complete as charted.  VSS, afebrile.  Medicated with oxycodone 2.5mg po for left hip pain, did fall asleep after receiving med.  Has been turned every 2 hours.  Carrera remains patent draining clear yellow urine.    Face to face report given with opportunity to observe patient.    Report given to JOE Parisi   7/23/2019  12:34 AM

## 2019-07-23 NOTE — PHARMACY
[facility]    Pharmacy      Antimicrobial Stewardship Note     Current antimicrobial therapy:  Anti-infectives (From now, onward)    Start     Dose/Rate Route Frequency Ordered Stop    07/23/19 0745  amoxicillin (AMOXIL) capsule 500 mg      500 mg Oral EVERY 8 HOURS SCHEDULED 07/23/19 0737      07/23/19 0000  amoxicillin (AMOXIL) 500 MG capsule      500 mg Oral EVERY 8 HOURS 07/23/19 0752          Indication: UTI     Days of Therapy: Day 1 (3 previous days of cefdinir)      Pertinent labs:  Creatinine   Creatinine   Date Value Ref Range Status   07/23/2019 0.59 0.52 - 1.04 mg/dL Final   07/20/2019 0.63 0.52 - 1.04 mg/dL Final   07/18/2019 0.71 0.52 - 1.04 mg/dL Final     WBC   WBC   Date Value Ref Range Status   07/20/2019 7.9 4.0 - 11.0 10e9/L Final   07/18/2019 8.6 4.0 - 11.0 10e9/L Final     Procalcitonin No results found for: PCAL  CRP No results found for: CRP    Culture Results:   7-Day Micro Results     Procedure Component Value Units Date/Time   Active MRSA Surveillance Culture [[id number]] Collected: 07/19/19 0145   Order Status: Completed Lab Status: Final result Updated: 07/20/19 0641   Specimen: Swab from Nose     Specimen Description Swab    Culture Micro No MRSA isolated   Urine Culture Aerobic Bacterial [[id number]] (Abnormal) Collected: 07/18/19 2249   Order Status: Completed Lab Status: Preliminary result Updated: 07/22/19 1408   Specimen: Catheterized Urine from Urine catheter     Specimen Description Catheterized Urine    Culture Micro >100,000 colonies/mL   Alpha hemolytic Streptococcus   Identification to follow.   Abnormal      <10,000 colonies/mL   Lactobacillus species   No further identification or sensitivity done   Abnormal      Recommendations/Interventions:  1. No recommendations at this time, will continue to monitor.     Juan Antonio Bills  July 23, 2019

## 2019-07-23 NOTE — PLAN OF CARE
Patient alert and oriented, forgetful. Vital signs as charted. Patient was supposed to discharge this morning but had emesis x2, PRN Zofran 4 mg Po given then Compazine 12.5 mg suppository given. Nausea appears to increase with movement. Patient had PRN Tylenol for hip pain and encouraged use of ice pack to left hip for comfort. Patient did not eat breakfast this morning and due to nausea/emesis medications were given later in shift as tolerated (see MAR). Patient did have toast, yogurt, and tea for lunch, tolerated well. Unable to start Amoxicillin this morning as ordered due to N&V, but did give 1400 dose. Per Hospitalist limit Roxicodone use and try Tylenol/Ibuprofen first. Spoke with Daughter-in-law Lucie odom who stated that oxycodone always makes patient nauseous. Carrera remains in place, adequate urine output. Patient has no IV access, encourage fluids as tolerated. Patient non-weight bearing for 6 weeks, Morgan lift for transfers per report/PT. Patient requested PRN Milk of Mag for constipation, bowel sounds active, last BM 7/21 per flowsheet, no results yet. Lungs clear. Patient slept off and on throughout the day. Call light within reach, alarms on, free from falls, makes needs known.     Face to face report given with opportunity to observe patient.    Report given to Kb Barrios   7/23/2019  3:33 PM

## 2019-07-23 NOTE — PLAN OF CARE
Discharge Planner OT   Patient plan for discharge: SNF  Current status: Pt was supine in bed upon OT arrival. She had a bucket in her lap due to vomiting earlier. Pt concerned about vomiting due to constipation; nursing informed. Pt agreeable to participating in BUE AROM exercises for 2x10, including 2 hand clasp for shoulder flexion, elbow flex/ext, forearm pro/sup, wrist flex/ext, and finger flex/ext. Pt needs verbal cues to move RUE to max end range.   Barriers to return to prior living situation: Hip fx, LLE weight bearing precautions, fall risk, impaired cognition, mechanical lift for mobility, weakness   Recommendations for discharge: SNF  Rationale for recommendations: Pt is exhibiting increased deficits in ADLs/functional mobility due to a L hip fx and NWB status x 6 weeks. Pt requires a mechanical lift for transfers and would not be safe to return to [facility].       Entered by: Leeann Arriaga 07/23/2019 2:03 PM

## 2019-07-24 VITALS
HEART RATE: 58 BPM | WEIGHT: 130.73 LBS | BODY MASS INDEX: 24.06 KG/M2 | DIASTOLIC BLOOD PRESSURE: 58 MMHG | RESPIRATION RATE: 16 BRPM | HEIGHT: 62 IN | SYSTOLIC BLOOD PRESSURE: 179 MMHG | OXYGEN SATURATION: 93 % | TEMPERATURE: 97.3 F

## 2019-07-24 LAB
BACTERIA SPEC CULT: ABNORMAL
BACTERIA SPEC CULT: ABNORMAL
GLUCOSE BLDC GLUCOMTR-MCNC: 116 MG/DL (ref 70–99)
GLUCOSE BLDC GLUCOMTR-MCNC: 133 MG/DL (ref 70–99)
GLUCOSE BLDC GLUCOMTR-MCNC: 179 MG/DL (ref 70–99)
SPECIMEN SOURCE: ABNORMAL

## 2019-07-24 PROCEDURE — 00000146 ZZHCL STATISTIC GLUCOSE BY METER IP

## 2019-07-24 PROCEDURE — 25000132 ZZH RX MED GY IP 250 OP 250 PS 637: Mod: GY | Performed by: INTERNAL MEDICINE

## 2019-07-24 PROCEDURE — 25000132 ZZH RX MED GY IP 250 OP 250 PS 637: Mod: GY | Performed by: HOSPITALIST

## 2019-07-24 PROCEDURE — 25000132 ZZH RX MED GY IP 250 OP 250 PS 637: Mod: GY | Performed by: NURSE PRACTITIONER

## 2019-07-24 RX ORDER — AMOXICILLIN 500 MG/1
500 CAPSULE ORAL EVERY 8 HOURS
Qty: 5 CAPSULE | DISCHARGE
Start: 2019-07-24 | End: 2019-11-05

## 2019-07-24 RX ORDER — ACETAMINOPHEN 325 MG/1
650 TABLET ORAL EVERY 6 HOURS
Status: DISCONTINUED | OUTPATIENT
Start: 2019-07-24 | End: 2019-07-24 | Stop reason: HOSPADM

## 2019-07-24 RX ORDER — FAMOTIDINE 20 MG/1
20 TABLET, FILM COATED ORAL 2 TIMES DAILY
Qty: 14 TABLET | DISCHARGE
Start: 2019-07-24

## 2019-07-24 RX ORDER — OXYCODONE HYDROCHLORIDE 5 MG/1
2.5 TABLET ORAL EVERY 6 HOURS PRN
Qty: 7 TABLET | Refills: 0 | Status: SHIPPED | OUTPATIENT
Start: 2019-07-24 | End: 2019-11-05

## 2019-07-24 RX ORDER — ACETAMINOPHEN 325 MG/1
650 TABLET ORAL EVERY 6 HOURS
DISCHARGE
Start: 2019-07-24 | End: 2019-11-05

## 2019-07-24 RX ADMIN — ACETAMINOPHEN 650 MG: 325 TABLET, FILM COATED ORAL at 14:00

## 2019-07-24 RX ADMIN — ACETAMINOPHEN 650 MG: 325 TABLET, FILM COATED ORAL at 07:56

## 2019-07-24 RX ADMIN — HYDROCORTISONE: 25 CREAM TOPICAL at 08:32

## 2019-07-24 RX ADMIN — IBUPROFEN 400 MG: 400 TABLET ORAL at 12:21

## 2019-07-24 RX ADMIN — AMOXICILLIN 500 MG: 500 CAPSULE ORAL at 06:23

## 2019-07-24 RX ADMIN — IBUPROFEN 400 MG: 400 TABLET ORAL at 06:23

## 2019-07-24 RX ADMIN — Medication 1 CAPSULE: at 08:00

## 2019-07-24 RX ADMIN — FAMOTIDINE 20 MG: 20 TABLET, FILM COATED ORAL at 08:00

## 2019-07-24 RX ADMIN — ACETAMINOPHEN 650 MG: 325 TABLET, FILM COATED ORAL at 02:44

## 2019-07-24 RX ADMIN — ASPIRIN 81 MG: 81 TABLET, COATED ORAL at 08:00

## 2019-07-24 RX ADMIN — AMLODIPINE BESYLATE 5 MG: 5 TABLET ORAL at 08:00

## 2019-07-24 RX ADMIN — INSULIN ASPART 1 UNITS: 100 INJECTION, SOLUTION INTRAVENOUS; SUBCUTANEOUS at 12:21

## 2019-07-24 RX ADMIN — GLIMEPIRIDE 4 MG: 2 TABLET ORAL at 08:05

## 2019-07-24 RX ADMIN — GABAPENTIN 200 MG: 100 CAPSULE ORAL at 08:00

## 2019-07-24 RX ADMIN — AMOXICILLIN 500 MG: 500 CAPSULE ORAL at 14:01

## 2019-07-24 ASSESSMENT — ACTIVITIES OF DAILY LIVING (ADL)
ADLS_ACUITY_SCORE: 22
ADLS_ACUITY_SCORE: 20
ADLS_ACUITY_SCORE: 22
ADLS_ACUITY_SCORE: 20

## 2019-07-24 ASSESSMENT — MIFFLIN-ST. JEOR: SCORE: 971.25

## 2019-07-24 NOTE — PROGRESS NOTES
Patient discharged at 2:30 PM via wheel chair accompanied by other:HLT and staff. Prescriptions sent with patient to fill . All belongings sent with patient.     Discharge instructions reviewed with n/a. Listed belongings gathered and returned to patient. yes    Patient discharged to [facility].   Report called to Nursing Home:  Fina DIAZ    Core Measures and Vaccines  Core Measures applicable during stay: No. If yes, state diagnosis: n/a  Pneumonia and Influenza given prior to discharge, if indicated: N/A    Surgical Patient   Surgical Procedures during stay: no  Did patient receive discharge instruction on wound care and recognition of infection symptoms? N/A    MISC  Follow up appointment made:  No  Home and hospital aquired medications returned to patient: Yes  Patient reports pain was well managed at discharge: Yes

## 2019-07-24 NOTE — PLAN OF CARE
"Reason for hospital stay:  Syncope / Fall / Left greater trochanter fx    Most recent vitals: /56   Pulse 58   Temp 97.7  F (36.5  C) (Tympanic)   Resp 16   Ht 1.575 m (5' 2\")   Wt 60.2 kg (132 lb 11.5 oz)   SpO2 93%   BMI 24.27 kg/m      Pain Management:  Complains of left hip pain and rt shoulder pain at times - especially with activity. Gave ibuprofen with effective pain management. Denied any further pain this evening. Stated \"more comfortable when I'm in bed.\"    Orientation:  A+O x3 - calls appropriately and makes needs known.    Cardiac:  Bradycardic with HR 50s.    Respiratory:  WDL - Lungs clear - sats 89-93% on RA. Encouraged coughing and deep breathing.     GI:  WDL - Denied any nausea this evening. Ate all of supper - 2 pieces of fish, tomato soup, and terrell food cake. Had medium BM this evening.    :  Carrera catheter patent and draining clear mayr urine.     Skin Issues:  Flaky Rash to lower back, hydrocortisone applied per order.     ABX:  Continues on PO Amoxil    Mobility:  Morgan lift - non wt bearing for 6 weeks to [facility].     Safety:  Alarms on    Face to face report given with opportunity to observe patient.    Report given to Julianna Bae   7/23/2019  11:26 PM  "

## 2019-07-24 NOTE — PLAN OF CARE
Occupational Therapy Discharge Summary    Reason for therapy discharge:    Discharged to transitional care facility.    Progress towards therapy goal(s). See [facility] on Care [facility] record for goal details.  Goals partially met.  Barriers to achieving goals:   discharge from facility.    Therapy recommendation(s):    Continued therapy is recommended.  Rationale/Recommendations:  OT evaluation recommended by evaluating OT at SNF.

## 2019-07-24 NOTE — PLAN OF CARE
Pt A&Ox3. She reports generalized pain and received tylenol and ibuprofen with some relief. Afebrile HR payam 50s. /43. RR 16 and sating greater than 92% on room air. Lung sounds are clear. Bowel sounds are active. Denies any nausea. Carrera patent with adequate output. Alarms on, call light within reach and pt calls appropriately.     Face to face report given with opportunity to observe patient.    Report given to JOE Duarte   7/24/2019  7:54 AM

## 2019-07-24 NOTE — PLAN OF CARE
"Reason for hospital stay:  Syncope  Most recent vitals: /58   Pulse 58   Temp 97.3  F (36.3  C)   Resp 16   Ht 1.575 m (5' 2\")   Wt 59.3 kg (130 lb 11.7 oz)   SpO2 93%   BMI 23.91 kg/m    Pain Management:  c/o L hip pain, alternating Ibuprofen et Tylenol  Orientation:  A/O  Cardiac:  Bradycardic at times  Respiratory:  Lung sounds clear bilat  GI:  Bowel sounds audible et active  :  Carrera patent  Nutrition:  Adequate  Skin Issues:  Intact  IVF:  NO IV Access  ABX:  Amoxicillin  Mobility:  Morgan lift for transfers  Safety:  Alarms audible et active x4  Comments:      7/24/2019  2:44 PM  Felicia Argueta    "

## 2019-07-24 NOTE — PLAN OF CARE
Physical Therapy Discharge Summary    Reason for therapy discharge:    Discharged to transitional care facility.    Progress towards therapy goal(s). See goals on Care Plan in [facility] electronic health record for goal details.  Goals partially met.  Barriers to achieving goals:   limited tolerance for therapy and discharge from facility.    Therapy recommendation(s):    Continued therapy is recommended.  Rationale/Recommendations:  Pt is NWB for 6 weeks LLE which limits amount of therapy pt will be able to participate in until that time. Once able to [facility] pt will need PT for strengthening, gait training, transfer training..

## 2019-10-11 ENCOUNTER — HOSPITAL ENCOUNTER (OUTPATIENT)
Dept: CT IMAGING | Facility: HOSPITAL | Age: 84
Discharge: HOME | End: 2019-10-11
Attending: FAMILY MEDICINE | Admitting: FAMILY MEDICINE
Payer: MEDICARE

## 2019-10-11 DIAGNOSIS — M25.551 BILATERAL HIP PAIN: ICD-10-CM

## 2019-10-11 DIAGNOSIS — M25.552 BILATERAL HIP PAIN: ICD-10-CM

## 2019-10-11 PROCEDURE — 73700 CT LOWER EXTREMITY W/O DYE: CPT | Mod: TC,50

## 2019-11-11 PROBLEM — S72.111D: Status: ACTIVE | Noted: 2019-11-11

## 2019-11-11 PROBLEM — S72.112A: Status: ACTIVE | Noted: 2019-10-04

## 2019-11-20 ENCOUNTER — TELEPHONE (OUTPATIENT)
Dept: FAMILY MEDICINE | Facility: OTHER | Age: 84
End: 2019-11-20

## 2019-11-20 NOTE — TELEPHONE ENCOUNTER
Anne hogue from [facility] and states they received a fax from Dr. Castrejon with sliding scale orders, but they are unable to read it. She would like if this could be re faxed. Please advise, thank you.

## 2021-05-19 NOTE — PROVIDER NOTIFICATION
Dr caballero text paged in re pt Neurontin. Per christiano @ [facility] in [place] pt takes Neurontin 200 mg bid  
Dr garrison text paged with results of pt CT scan Fracture of the greater trochanter..  
MD notified of patient incontinence and bladdar scan amount. States straight cath patient once now.   
Paged Collette to report elevated BP's and to? If I should push hydralazine. Collette advised this writer to hold the medication.   Paged to report orthostatics. Will continue to monitor   
Patient had a second emesis at 0840. Provider notified via telephone that patient had PO Zofran 4 mg at 0755, has no IV access, had a second emesis and no other orders available.   
Updated LORENA Hicks on Pt  for lunch. New order for change in Amaryl dose.  
Updated LORENA Hicks on Pt's BG 48 with am labs, increase to 103 with boost and pudding intake. Hold the Amaryl dose this am.  
Updated LORENA Hicks on the inability to get compression stocking at this time. Discussed ace wrapping the legs with decline at this time.  
You can access the FollowMyHealth Patient Portal offered by [facility] by registering at the following website: [email or web address]. By joining [facility]’s FollowMyHealth portal, you will also be able to view your health information using other applications (apps) compatible with our system.

## 2023-10-04 NOTE — PLAN OF CARE
Reason for hospital stay:  fall    Most recent vitals: BP (!) 183/62   Temp 98.5  F (36.9  C) (Tympanic)   Resp 16   Wt 60.1 kg (132 lb 7.9 oz)   SpO2 93%   Pain Management:  tylenol & oxy 2.5 mg  Orientation:  wnl  Cardiac:  wnl  Respiratory:  wnl  GI:  bowel sounds active  :  pickering for dribbling  Diet:regular  Skin Issues:  hives lower back hydrocortisone cream applied  IVF:  sl  ABX:  none  Mobility:  tomasa  Safety:  call light within reach bed alarm on    Comments:    7/20/2019  10:49 PM  GRIFFIN VALERO     English